# Patient Record
Sex: FEMALE | Race: WHITE | Employment: FULL TIME | URBAN - NONMETROPOLITAN AREA
[De-identification: names, ages, dates, MRNs, and addresses within clinical notes are randomized per-mention and may not be internally consistent; named-entity substitution may affect disease eponyms.]

---

## 2017-11-01 LAB
CHOLESTEROL, TOTAL: 222 MG/DL
CHOLESTEROL/HDL RATIO: 3.5
HDLC SERPL-MCNC: 63 MG/DL (ref 35–70)
LDL CHOLESTEROL CALCULATED: 160 MG/DL (ref 0–160)
TRIGL SERPL-MCNC: 147 MG/DL
VLDLC SERPL CALC-MCNC: 29 MG/DL

## 2018-10-31 ENCOUNTER — OFFICE VISIT (OUTPATIENT)
Dept: FAMILY MEDICINE CLINIC | Age: 48
End: 2018-10-31
Payer: COMMERCIAL

## 2018-10-31 VITALS
SYSTOLIC BLOOD PRESSURE: 122 MMHG | OXYGEN SATURATION: 98 % | DIASTOLIC BLOOD PRESSURE: 80 MMHG | RESPIRATION RATE: 16 BRPM | TEMPERATURE: 98.1 F | HEART RATE: 59 BPM | BODY MASS INDEX: 30.96 KG/M2 | WEIGHT: 209 LBS | HEIGHT: 69 IN

## 2018-10-31 DIAGNOSIS — Z11.1 TUBERCULOSIS SCREENING: ICD-10-CM

## 2018-10-31 DIAGNOSIS — I10 ESSENTIAL HYPERTENSION: ICD-10-CM

## 2018-10-31 DIAGNOSIS — Z13.1 DIABETES MELLITUS SCREENING: ICD-10-CM

## 2018-10-31 DIAGNOSIS — Z00.00 WELLNESS EXAMINATION: Primary | ICD-10-CM

## 2018-10-31 DIAGNOSIS — E78.00 HIGH CHOLESTEROL: ICD-10-CM

## 2018-10-31 DIAGNOSIS — Z11.1 ENCOUNTER FOR PPD TEST: ICD-10-CM

## 2018-10-31 PROCEDURE — 99203 OFFICE O/P NEW LOW 30 MIN: CPT | Performed by: NURSE PRACTITIONER

## 2018-10-31 PROCEDURE — 86580 TB INTRADERMAL TEST: CPT | Performed by: NURSE PRACTITIONER

## 2018-10-31 RX ORDER — OMEPRAZOLE 20 MG/1
20 CAPSULE, DELAYED RELEASE ORAL DAILY
COMMUNITY

## 2018-10-31 RX ORDER — ATORVASTATIN CALCIUM 20 MG/1
20 TABLET, FILM COATED ORAL DAILY
COMMUNITY

## 2018-10-31 ASSESSMENT — PATIENT HEALTH QUESTIONNAIRE - PHQ9
SUM OF ALL RESPONSES TO PHQ QUESTIONS 1-9: 0
1. LITTLE INTEREST OR PLEASURE IN DOING THINGS: 0
SUM OF ALL RESPONSES TO PHQ QUESTIONS 1-9: 0
2. FEELING DOWN, DEPRESSED OR HOPELESS: 0
SUM OF ALL RESPONSES TO PHQ9 QUESTIONS 1 & 2: 0

## 2018-11-02 ENCOUNTER — TELEPHONE (OUTPATIENT)
Dept: FAMILY MEDICINE CLINIC | Age: 48
End: 2018-11-02

## 2018-11-02 LAB
INDURATION: NORMAL
TB SKIN TEST: NORMAL

## 2018-11-06 PROBLEM — I10 ESSENTIAL HYPERTENSION: Status: ACTIVE | Noted: 2018-11-06

## 2018-11-06 PROBLEM — E78.00 HIGH CHOLESTEROL: Status: ACTIVE | Noted: 2018-11-06

## 2018-11-06 ASSESSMENT — ENCOUNTER SYMPTOMS
VOMITING: 0
ABDOMINAL DISTENTION: 0
ANAL BLEEDING: 0
EYES NEGATIVE: 1
SHORTNESS OF BREATH: 0
COUGH: 0
ABDOMINAL PAIN: 0
VOICE CHANGE: 0
TROUBLE SWALLOWING: 0

## 2018-11-06 NOTE — PROGRESS NOTES
10/31/2018    Mainor Dyson (:  1970) is a 51 y.o. female, here for evaluation of the following medical concerns:    Roe Mendez is here to become an established patient, she also needs a physical formed filled out for her employer. Hypertension:  Home blood pressure monitoring: No.  She is adherent to a low sodium diet. Patient denies chest pain, shortness of breath, headache, lightheadedness, blurred vision, peripheral edema, palpitations, dry cough and fatigue. Antihypertensive medication side effects: no medication side effects noted. Use of agents associated with hypertension: none. No results found for: NA No results found for: BUN No results found for: GLUCOSE   No results found for: K No results found for: CREATININE        Review of Systems   Constitutional: Negative for chills, fatigue and fever. HENT: Negative for congestion, dental problem, trouble swallowing and voice change. Eyes: Negative. Respiratory: Negative for cough and shortness of breath. Cardiovascular: Negative for chest pain and leg swelling. Gastrointestinal: Negative for abdominal distention, abdominal pain, anal bleeding and vomiting. Genitourinary: Negative for difficulty urinating and dysuria. Musculoskeletal: Negative. Skin: Negative. Neurological: Negative. Negative for dizziness, facial asymmetry and headaches. Psychiatric/Behavioral: Negative. Negative for sleep disturbance and suicidal ideas. Prior to Visit Medications    Medication Sig Taking?  Authorizing Provider   omeprazole (PRILOSEC) 20 MG delayed release capsule Take 20 mg by mouth daily Yes Historical Provider, MD   UNABLE TO FIND  Yes Historical Provider, MD   atorvastatin (LIPITOR) 20 MG tablet Take 20 mg by mouth daily Yes Historical Provider, MD   MEGARED OMEGA-3 KRILL OIL PO Take 800 mg by mouth daily Yes Historical Provider, MD   Nebivolol HCl (BYSTOLIC PO) Take 10 mg by mouth daily  Yes

## 2018-11-07 ENCOUNTER — NURSE ONLY (OUTPATIENT)
Dept: FAMILY MEDICINE CLINIC | Age: 48
End: 2018-11-07
Payer: COMMERCIAL

## 2018-11-07 DIAGNOSIS — Z11.1 VISIT FOR MANTOUX TEST: Primary | ICD-10-CM

## 2018-11-07 PROCEDURE — 86580 TB INTRADERMAL TEST: CPT | Performed by: NURSE PRACTITIONER

## 2018-11-09 LAB
INDURATION: NORMAL
TB SKIN TEST: NORMAL

## 2019-05-13 ENCOUNTER — HOSPITAL ENCOUNTER (EMERGENCY)
Age: 49
Discharge: HOME | End: 2019-05-13
Payer: COMMERCIAL

## 2019-05-13 VITALS
TEMPERATURE: 98.1 F | SYSTOLIC BLOOD PRESSURE: 128 MMHG | OXYGEN SATURATION: 95 % | WEIGHT: 200 LBS | DIASTOLIC BLOOD PRESSURE: 84 MMHG | RESPIRATION RATE: 16 BRPM | BODY MASS INDEX: 29.53 KG/M2

## 2019-05-13 DIAGNOSIS — R30.0 DYSURIA: ICD-10-CM

## 2019-05-13 DIAGNOSIS — N30.00 ACUTE CYSTITIS WITHOUT HEMATURIA: Primary | ICD-10-CM

## 2019-05-13 LAB
BACTERIA: ABNORMAL /HPF
BILIRUBIN URINE: NEGATIVE
BLOOD, URINE: ABNORMAL
CASTS 2: ABNORMAL /LPF
CASTS UA: ABNORMAL /LPF
CHARACTER, URINE: CLEAR
COLOR: ABNORMAL
CRYSTALS, UA: ABNORMAL
EPITHELIAL CELLS, UA: ABNORMAL /HPF
GLUCOSE URINE: NEGATIVE MG/DL
KETONES, URINE: NEGATIVE
LEUKOCYTE ESTERASE, URINE: ABNORMAL
MISCELLANEOUS 2: ABNORMAL
NITRITE, URINE: POSITIVE
PH UA: 6.5 (ref 5–9)
PROTEIN UA: NEGATIVE
RBC URINE: ABNORMAL /HPF
RENAL EPITHELIAL, UA: ABNORMAL
SPECIFIC GRAVITY, URINE: 1.01 (ref 1–1.03)
UROBILINOGEN, URINE: 1 EU/DL (ref 0–1)
WBC UA: ABNORMAL /HPF
YEAST: ABNORMAL

## 2019-05-13 PROCEDURE — 87184 SC STD DISK METHOD PER PLATE: CPT

## 2019-05-13 PROCEDURE — 87186 SC STD MICRODIL/AGAR DIL: CPT

## 2019-05-13 PROCEDURE — 81001 URINALYSIS AUTO W/SCOPE: CPT

## 2019-05-13 PROCEDURE — 87077 CULTURE AEROBIC IDENTIFY: CPT

## 2019-05-13 PROCEDURE — 99213 OFFICE O/P EST LOW 20 MIN: CPT

## 2019-05-13 PROCEDURE — 87086 URINE CULTURE/COLONY COUNT: CPT

## 2019-05-13 PROCEDURE — 99213 OFFICE O/P EST LOW 20 MIN: CPT | Performed by: NURSE PRACTITIONER

## 2019-05-13 RX ORDER — NITROFURANTOIN 25; 75 MG/1; MG/1
100 CAPSULE ORAL 2 TIMES DAILY
Qty: 20 CAPSULE | Refills: 0 | Status: SHIPPED | OUTPATIENT
Start: 2019-05-13 | End: 2019-05-23

## 2019-05-13 RX ORDER — CARVEDILOL 3.12 MG/1
3.12 TABLET ORAL 2 TIMES DAILY WITH MEALS
COMMUNITY

## 2019-05-13 ASSESSMENT — ENCOUNTER SYMPTOMS
BACK PAIN: 0
ABDOMINAL PAIN: 0
COLOR CHANGE: 0

## 2019-05-13 ASSESSMENT — PAIN SCALES - GENERAL: PAINLEVEL_OUTOF10: 2

## 2019-05-13 ASSESSMENT — PAIN DESCRIPTION - PAIN TYPE: TYPE: ACUTE PAIN

## 2019-05-13 NOTE — ED TRIAGE NOTES
Pt walked to room 5. Pt here with complaints of odor, frequency and not able to empty out. Started 6 days ago.

## 2019-05-13 NOTE — ED PROVIDER NOTES
[place] [address]  Urgent Care Encounter       CHIEF COMPLAINT       Chief Complaint   Patient presents with    Urinary Frequency     Frequency, urgency and not feeling like she is emptying out. Nurses Notes reviewed and I agree except as noted in the HPI. HISTORY OF PRESENT ILLNESS   Adam Alfred is a 47 y.o. female who presents     Patient states that for the last 2-3 days she has noticed foul-smelling urine, incomplete urinating, and increased frequency. She states that she's had UTIs in the past, and these symptoms are similar. She has been taking over-the-counter AZO to help alleviate the symptoms, and it is somewhat effective. Denies any back pain or abdominal cramping. Dysuria    This is a new problem. The current episode started 2 days ago. The problem occurs every urination. The problem has not changed since onset. The quality of the pain is described as burning and aching. There has been no fever. Associated symptoms include frequency, hesitancy and urgency. Pertinent negatives include no discharge, no hematuria and no flank pain. She has tried nothing for the symptoms. Her past medical history is significant for recurrent UTIs. Her past medical history does not include kidney stones, single kidney, urological procedure, urinary stasis or catheterization. REVIEW OF SYSTEMS     Review of Systems   Gastrointestinal: Negative for abdominal pain. Genitourinary: Positive for dysuria, frequency, hesitancy and urgency. Negative for decreased urine volume, difficulty urinating, dyspareunia, enuresis, flank pain, genital sores, hematuria, menstrual problem, pelvic pain, vaginal bleeding, vaginal discharge and vaginal pain. Musculoskeletal: Negative for back pain. Skin: Negative for color change, pallor, rash and wound. All other systems reviewed and are negative.       PAST MEDICAL HISTORY         Diagnosis Date    Acid reflux     High cholesterol 11/6/2018    Hypercholesteremia     Hypertension        SURGICALHISTORY     Patient  has a past surgical history that includes Tonsillectomy and Dental surgery. CURRENT MEDICATIONS       Previous Medications    ATORVASTATIN (LIPITOR) 20 MG TABLET    Take 20 mg by mouth daily    CARVEDILOL (COREG) 3.125 MG TABLET    Take 3.125 mg by mouth 2 times daily (with meals)    OMEPRAZOLE (PRILOSEC) 20 MG DELAYED RELEASE CAPSULE    Take 20 mg by mouth daily       ALLERGIES     Patient is is allergic to shellfish-derived products. Patients   Immunization History   Administered Date(s) Administered    DTaP (Infanrix) 1970, 1970, 1970, 03/18/1974    IPV (Ipol) 1970, 1970, 03/18/1974    MMR 01/06/1970    PPD Test 10/31/2018, 10/31/2018, 11/07/2018       FAMILY HISTORY     Patient's family history is not on file. SOCIAL HISTORY     Patient  reports that she has never smoked. She has never used smokeless tobacco. She reports that she drinks alcohol. She reports that she does not use drugs. PHYSICAL EXAM     ED TRIAGE VITALS  BP: 128/84, Temp: 98.1 °F (36.7 °C),  , Resp: 16, SpO2: 95 %,Estimated body mass index is 29.53 kg/m² as calculated from the following:    Height as of 10/31/18: 5' 9\" (1.753 m). Weight as of this encounter: 200 lb (90.7 kg). ,Patient's last menstrual period was 04/23/2019 (exact date). Physical Exam   Constitutional: She is oriented to person, place, and time. She appears well-developed and well-nourished. No distress. Musculoskeletal: Normal range of motion. Neurological: She is alert and oriented to person, place, and time. Skin: Skin is warm. Capillary refill takes less than 2 seconds. She is not diaphoretic. Psychiatric: She has a normal mood and affect. Her behavior is normal. Judgment and thought content normal.   Nursing note and vitals reviewed. DIAGNOSTIC RESULTS     Labs:No results found for this visit on 05/13/19.     IMAGING:    No orders to display URGENT CARE COURSE:     Vitals:    05/13/19 1655   BP: 128/84   Resp: 16   Temp: 98.1 °F (36.7 °C)   TempSrc: Oral   SpO2: 95%   Weight: 200 lb (90.7 kg)       Medications - No data to display         PROCEDURES:  None    FINAL IMPRESSION      1. Acute cystitis without hematuria    2. Dysuria          DISPOSITION/ PLAN   Patient is discharged home with prescription for Macrobid that she should start today. Patient's urine will be sent to Main lab as they could not be ran in urgent care setting due to patient taking AZO. She should continue taking adequate amounts of fluid to help flush out any bacteria from urinary tract. Recommend follow-up with primary care provider within one week if symptoms do not seem to be improving.         PATIENT REFERRED TO:  RAJ Norris CNP  [address] [facility] [address]      DISCHARGE MEDICATIONS:  New Prescriptions    NITROFURANTOIN, MACROCRYSTAL-MONOHYDRATE, (MACROBID) 100 MG CAPSULE    Take 1 capsule by mouth 2 times daily for 10 days       Discontinued Medications    BRINZOLAMIDE (AZOPT) 1 % OPHTHALMIC SUSPENSION    1 drop 3 times daily    MEGARED OMEGA-3 KRILL OIL PO    Take 800 mg by mouth daily    NEBIVOLOL HCL (BYSTOLIC PO)    Take 10 mg by mouth daily     UNABLE TO FIND           Current Discharge Medication List          RAJ Goodman NP    (Please note that portions of this note were completed with a voice recognition program. Efforts were made to edit the dictations but occasionally words are mis-transcribed.)         RAJ Wong NP  05/13/19 7031

## 2019-05-16 LAB
ORGANISM: ABNORMAL
URINE CULTURE REFLEX: ABNORMAL

## 2019-10-03 LAB
A/G RATIO: NORMAL
ALBUMIN SERPL-MCNC: 4.2 G/DL
ALP BLD-CCNC: 78 U/L
ALT SERPL-CCNC: 21 U/L
AST SERPL-CCNC: 24 U/L
AVERAGE GLUCOSE: 111
BASOPHILS ABSOLUTE: 0 /ΜL
BASOPHILS RELATIVE PERCENT: 0.7 %
BILIRUB SERPL-MCNC: 0.7 MG/DL (ref 0.1–1.4)
BILIRUBIN DIRECT: 0.2 MG/DL
BILIRUBIN, INDIRECT: NORMAL
CHOLESTEROL, TOTAL: 142 MG/DL
CHOLESTEROL/HDL RATIO: 2.5
EOSINOPHILS ABSOLUTE: 100 /ΜL
EOSINOPHILS RELATIVE PERCENT: 1.4 %
GLOBULIN: NORMAL
GLUCOSE FASTING: 98 MG/DL
HBA1C MFR BLD: 5.5 %
HCT VFR BLD CALC: 36.8 % (ref 36–46)
HDLC SERPL-MCNC: 50 MG/DL (ref 35–70)
HEMOGLOBIN: 12.3 G/DL (ref 12–16)
LDL CHOLESTEROL CALCULATED: 81 MG/DL (ref 0–160)
LYMPHOCYTES ABSOLUTE: 1200 /ΜL
LYMPHOCYTES RELATIVE PERCENT: 22.4 %
MCH RBC QN AUTO: 30.2 PG
MCHC RBC AUTO-ENTMCNC: 33.4 G/DL
MCV RBC AUTO: 90.3 FL
MONOCYTES ABSOLUTE: 600 /ΜL
MONOCYTES RELATIVE PERCENT: 10.3 %
NEUTROPHILS ABSOLUTE: 3500 /ΜL
NEUTROPHILS RELATIVE PERCENT: 65.2 %
PDW BLD-RTO: 13.5 %
PLATELET # BLD: 320 K/ΜL
PMV BLD AUTO: ABNORMAL FL
PROTEIN TOTAL: 6.2 G/DL
RBC # BLD: 4.07 10^6/ΜL
TRIGL SERPL-MCNC: 75 MG/DL
TSH SERPL DL<=0.05 MIU/L-ACNC: 2.09 UIU/ML
VLDLC SERPL CALC-MCNC: 15 MG/DL
WBC # BLD: 5.4 10^3/ML

## 2020-02-11 ENCOUNTER — TELEPHONE (OUTPATIENT)
Dept: FAMILY MEDICINE CLINIC | Age: 50
End: 2020-02-11

## 2020-02-11 NOTE — TELEPHONE ENCOUNTER
Called patient. No answer. Left message to return call.     Patient needs a HTN follow up    My chart message sent

## 2020-02-12 NOTE — TELEPHONE ENCOUNTER
spoke with patient. She stats that she follows with Dr. Pavithra Telles for HTN. Last seen in Sept 2019. He orders her labs and refills BP mediations.  Do you still need to see her?? Please advise

## 2020-02-12 NOTE — TELEPHONE ENCOUNTER
If she is following with Dr. Nicole Colon for her hypertension and does not need anything for myself that is fine. I would be more than happy to see her for her wellness visit though.

## 2020-02-17 ENCOUNTER — HOSPITAL ENCOUNTER (EMERGENCY)
Age: 50
Discharge: HOME | End: 2020-02-17
Payer: COMMERCIAL

## 2020-02-17 VITALS
BODY MASS INDEX: 25.92 KG/M2 | OXYGEN SATURATION: 100 % | HEIGHT: 69 IN | DIASTOLIC BLOOD PRESSURE: 80 MMHG | HEART RATE: 69 BPM | SYSTOLIC BLOOD PRESSURE: 136 MMHG | RESPIRATION RATE: 16 BRPM | WEIGHT: 175 LBS | TEMPERATURE: 97.6 F

## 2020-02-17 LAB
BILIRUBIN URINE: NEGATIVE
BLOOD, URINE: ABNORMAL
CHARACTER, URINE: CLEAR
COLOR: YELLOW
GLUCOSE, URINE: NEGATIVE MG/DL
KETONES, URINE: NEGATIVE
LEUKOCYTES, UA: ABNORMAL
NITRATE, UA: NEGATIVE
PH UA: 7 (ref 5–9)
PROTEIN UA: NEGATIVE MG/DL
REFLEX TO URINE C & S: ABNORMAL
SPECIFIC GRAVITY UA: 1.01 (ref 1–1.03)
UROBILINOGEN, URINE: 0.2 EU/DL (ref 0–1)

## 2020-02-17 PROCEDURE — 87086 URINE CULTURE/COLONY COUNT: CPT

## 2020-02-17 PROCEDURE — 87186 SC STD MICRODIL/AGAR DIL: CPT

## 2020-02-17 PROCEDURE — 99213 OFFICE O/P EST LOW 20 MIN: CPT

## 2020-02-17 PROCEDURE — 99213 OFFICE O/P EST LOW 20 MIN: CPT | Performed by: NURSE PRACTITIONER

## 2020-02-17 PROCEDURE — 87077 CULTURE AEROBIC IDENTIFY: CPT

## 2020-02-17 PROCEDURE — 81003 URINALYSIS AUTO W/O SCOPE: CPT

## 2020-02-17 RX ORDER — CEPHALEXIN 250 MG/1
250 CAPSULE ORAL 4 TIMES DAILY
Qty: 28 CAPSULE | Refills: 0 | Status: SHIPPED | OUTPATIENT
Start: 2020-02-17 | End: 2020-02-24

## 2020-02-17 ASSESSMENT — ENCOUNTER SYMPTOMS
ABDOMINAL PAIN: 1
DIARRHEA: 0
NAUSEA: 0
VOMITING: 0

## 2020-02-17 ASSESSMENT — PAIN DESCRIPTION - LOCATION: LOCATION: ABDOMEN

## 2020-02-17 ASSESSMENT — PAIN SCALES - GENERAL: PAINLEVEL_OUTOF10: 3

## 2020-02-17 NOTE — ED PROVIDER NOTES
Negative for diarrhea, nausea and vomiting. Genitourinary: Positive for dysuria, frequency, hesitancy and urgency. Negative for decreased urine volume, difficulty urinating, dyspareunia, enuresis, flank pain, genital sores, hematuria, menstrual problem, pelvic pain, vaginal bleeding, vaginal discharge and vaginal pain. Skin: Negative for rash. PAST MEDICAL HISTORY         Diagnosis Date    Acid reflux     High cholesterol 11/6/2018    Hypercholesteremia     Hypertension        SURGICALHISTORY     Patient  has a past surgical history that includes Tonsillectomy; Dental surgery; and Dilation and curettage of uterus. CURRENT MEDICATIONS       Discharge Medication List as of 2/17/2020  8:45 AM      CONTINUE these medications which have NOT CHANGED    Details   carvedilol (COREG) 3.125 MG tablet Take 3.125 mg by mouth 2 times daily (with meals)Historical Med      omeprazole (PRILOSEC) 20 MG delayed release capsule Take 20 mg by mouth dailyHistorical Med      atorvastatin (LIPITOR) 20 MG tablet Take 20 mg by mouth dailyHistorical Med             ALLERGIES     Patient is is allergic to shellfish-derived products. Patients   Immunization History   Administered Date(s) Administered    DTaP (Infanrix) 1970, 1969, 1969, 03/18/1974    MMR 01/06/1970    PPD Test 10/31/2018, 10/31/2018, 11/07/2018    Polio IPV (IPOL) 1969, 1969, 03/18/1974       FAMILY HISTORY     Patient's family history is not on file. SOCIAL HISTORY     Patient  reports that she has never smoked. She has never used smokeless tobacco. She reports current alcohol use. She reports that she does not use drugs. PHYSICAL EXAM     ED TRIAGE VITALS  BP: 136/80, Temp: 97.6 °F (36.4 °C), Pulse: 69, Resp: 16, SpO2: 100 %,Estimated body mass index is 25.84 kg/m² as calculated from the following:    Height as of this encounter: 5' 9\" (1.753 m). Weight as of this encounter: 175 lb (79.4 kg). ,Patient's last

## 2020-02-17 NOTE — ED NOTES
To [facility] with complaints of possible UTI. Symptoms started on Thursday. States she started out and a \"slow flow\", then developed urgency and frequency and foul smelling odor.      Supriya Merchant RN  02/17/20 5360

## 2020-02-18 ENCOUNTER — TELEPHONE (OUTPATIENT)
Dept: FAMILY MEDICINE CLINIC | Age: 50
End: 2020-02-18

## 2020-02-18 NOTE — LETTER
[address] [facility] [address] [facility] [place]  Phone: [phone]  Fax: [phone]    February 18, 2020    [address] [place] [address] [id number]    Dear Karen Parker,    Thank you for choosing [facility] on 2/17/20. Cindy Piper wanted to make sure that you understand your discharge instructions and that you were able to fill any prescriptions that may have been ordered for you. Please contact the office at the above phone number if you were advised to follow up with  Cindy Piper, or if you have any further questions or needs. Also, did you know [facility]. practices can often offer you an appointment on the same day that you call for acute issues. *We have some [facility] offices that offer Walk-in appointments; check our website for availability in your community, [email or web address].    *Evisits are now available for patients through [facility]. If you do not have [facility] DChart and are interested, please contact the office and a staff member may assist you or go to [email or web address].     Sincerely,    RAJ Perez CNP and [facility]

## 2020-02-19 LAB
ORGANISM: ABNORMAL
URINE CULTURE REFLEX: ABNORMAL

## 2020-03-03 NOTE — RESULT ENCOUNTER NOTE
It looks like the antibiotic was appropriate for her infection, please call her and get an update on how she is feeling.   Thank you

## 2022-11-02 ENCOUNTER — OFFICE VISIT (OUTPATIENT)
Dept: CARDIOLOGY CLINIC | Age: 52
End: 2022-11-02
Payer: COMMERCIAL

## 2022-11-02 VITALS
WEIGHT: 182 LBS | HEART RATE: 65 BPM | SYSTOLIC BLOOD PRESSURE: 148 MMHG | HEIGHT: 69 IN | BODY MASS INDEX: 26.96 KG/M2 | DIASTOLIC BLOOD PRESSURE: 70 MMHG

## 2022-11-02 DIAGNOSIS — R07.9 CHEST PAIN IN ADULT: ICD-10-CM

## 2022-11-02 DIAGNOSIS — R00.2 HEART PALPITATIONS: ICD-10-CM

## 2022-11-02 DIAGNOSIS — R06.02 SOB (SHORTNESS OF BREATH): Primary | ICD-10-CM

## 2022-11-02 PROCEDURE — 4004F PT TOBACCO SCREEN RCVD TLK: CPT | Performed by: INTERNAL MEDICINE

## 2022-11-02 PROCEDURE — G8427 DOCREV CUR MEDS BY ELIG CLIN: HCPCS | Performed by: INTERNAL MEDICINE

## 2022-11-02 PROCEDURE — 3074F SYST BP LT 130 MM HG: CPT | Performed by: INTERNAL MEDICINE

## 2022-11-02 PROCEDURE — 3078F DIAST BP <80 MM HG: CPT | Performed by: INTERNAL MEDICINE

## 2022-11-02 PROCEDURE — 3017F COLORECTAL CA SCREEN DOC REV: CPT | Performed by: INTERNAL MEDICINE

## 2022-11-02 PROCEDURE — 99204 OFFICE O/P NEW MOD 45 MIN: CPT | Performed by: INTERNAL MEDICINE

## 2022-11-02 PROCEDURE — G8484 FLU IMMUNIZE NO ADMIN: HCPCS | Performed by: INTERNAL MEDICINE

## 2022-11-02 PROCEDURE — G8419 CALC BMI OUT NRM PARAM NOF/U: HCPCS | Performed by: INTERNAL MEDICINE

## 2022-11-02 PROCEDURE — 93000 ELECTROCARDIOGRAM COMPLETE: CPT | Performed by: INTERNAL MEDICINE

## 2022-11-02 RX ORDER — ACETAMINOPHEN 160 MG
TABLET,DISINTEGRATING ORAL DAILY
COMMUNITY

## 2022-11-02 RX ORDER — CETIRIZINE HYDROCHLORIDE 10 MG/1
10 TABLET ORAL DAILY
COMMUNITY

## 2022-11-02 RX ORDER — AMLODIPINE BESYLATE 5 MG/1
5 TABLET ORAL DAILY
Qty: 30 TABLET | Refills: 3 | Status: SHIPPED | OUTPATIENT
Start: 2022-11-02

## 2022-11-02 RX ORDER — ISOSORBIDE MONONITRATE 30 MG/1
30 TABLET, EXTENDED RELEASE ORAL DAILY
COMMUNITY
End: 2022-11-02

## 2022-11-02 NOTE — PROGRESS NOTES
NEW PATIENT HERE FOR CHECK UP ABNORMAL TESTING AT [facility]    PT C/O MIGRANE HEADACHES AFTER STARTING IMDUR,WITH LIGHTHEADED AND NAUSEATED,CHEST DISCOMFORT, MORE SOB,     PT DENIES SWELLING IN LEGS AND FEET

## 2022-11-02 NOTE — PROGRESS NOTES
[address] [address]  Dept: [phone]  Dept Fax: [phone]  Loc: [phone]    Visit Date: 11/2/2022    Ms. Christie Henry is a 47 y.o. female  who presented for:  Chief Complaint   Patient presents with    New Patient    Shortness of Breath    Palpitations    Chest Pain     [facility] ABNORMAL STRESS       HPI:   48 yo F c hx of HTN, HLD is here to establish cardiology. Patient had been following up with Dr. Jacqueline Falcon at [facility] but wants to transfer care  here. She reports significant dyspnea on exertion. 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 Patient had Camilo Law in 06/2022. Continues to have fatigue and shortness of breath. Sleeps intermittently but does sleep of 7 hrs. Underwent stress testing at [facility] which showed basal to mid inferiolateral perfusion defect. Current Outpatient Medications:     norethindrone (AYGESTIN) 5 MG tablet, Take 5 mg by mouth daily, Disp: , Rfl:     Probiotic Product (PROBIOTIC DAILY PO), Take by mouth daily, Disp: , Rfl:     cetirizine (ZYRTEC) 10 MG tablet, Take 10 mg by mouth daily, Disp: , Rfl:     Cholecalciferol (VITAMIN D3) 50 MCG (2000 UT) CAPS, Take by mouth, Disp: , Rfl:     TURMERIC PO, Take 1,500 mg by mouth daily, Disp: , Rfl:     amLODIPine (NORVASC) 5 MG tablet, Take 1 tablet by mouth daily, Disp: 30 tablet, Rfl: 3    carvedilol (COREG) 3.125 MG tablet, Take 3.125 mg by mouth 2 times daily (with meals), Disp: , Rfl:     omeprazole (PRILOSEC) 20 MG delayed release capsule, Take 20 mg by mouth daily, Disp: , Rfl:     atorvastatin (LIPITOR) 20 MG tablet, Take 20 mg by mouth daily, Disp: , Rfl:     Past Medical History  Jaylin Coy  has a past medical history of AAA (abdominal aortic aneurysm), Acid reflux, High cholesterol, Hypercholesteremia, and Hypertension. Social History  Jaylin Coy  reports that she has never smoked.  She has never used smokeless tobacco. She reports current alcohol use. She reports that she does not use drugs. Family History  Nicolasa family history is not on file. Past Surgical History   Past Surgical History:   Procedure Laterality Date    DENTAL SURGERY      DILATION AND CURETTAGE OF UTERUS      FINGER SURGERY Right 2021    FOOT SURGERY Left 02/2021    FOOT RECONSTRUCTION    TONSILLECTOMY         Subjective:     REVIEW OF SYSTEMS  Constitutional: denies sweats, chills and fever  HENT: denies  congestion, sinus pressure, sneezing and sore throat. Eyes: denies  pain, discharge, redness and itching. Respiratory: denies apnea, cough  Gastrointestinal: denies blood in stool, constipation, diarrhea   Endocrine: denies cold intolerance, heat intolerance, polydipsia. Genitourinary: denies dysuria, enuresis, flank pain and hematuria. Musculoskeletal: denies arthralgias, joint swelling and neck pain. Neurological: denies numbness and headaches. Psychiatric/Behavioral: denies agitation, confusion, decreased concentration and dysphoric mood    All others reviewed and are negative. Objective:     BP (!) 160/92   Pulse 65   Ht 5' 9\" (1.753 m)   Wt 182 lb (82.6 kg)   BMI 26.88 kg/m²     Wt Readings from Last 3 Encounters:   11/02/22 182 lb (82.6 kg)   02/17/20 175 lb (79.4 kg)   05/13/19 200 lb (90.7 kg)     BP Readings from Last 3 Encounters:   11/02/22 (!) 160/92   02/17/20 136/80   05/13/19 128/84       PHYSICAL EXAM  Constitutional: Oriented to person, place, and time. Appears well-developed and well-nourished. HENT:   Head: Normocephalic and atraumatic. Eyes: EOM are normal. Pupils are equal, round, and reactive to light. Neck: Normal range of motion. Neck supple. No JVD present. Cardiovascular: Normal rate , normal heart sounds and intact distal pulses. Pulmonary/Chest: Effort normal and breath sounds normal. No respiratory distress. No wheezes. No rales. Abdominal: Soft. Bowel sounds are normal. No distension. There is no tenderness. Musculoskeletal: Normal range of motion. No edema. Neurological: Alert and oriented to person, place, and time. No cranial nerve deficit. Coordination normal.   Skin: Skin is warm and dry. Psychiatric: Normal mood and affect. No results found for: CKTOTAL, CKMB, CKMBINDEX    Lab Results   Component Value Date/Time    WBC 6.4 10/24/2022 08:02 AM    RBC 4.12 10/24/2022 08:02 AM    HGB 13.0 10/24/2022 08:02 AM    HCT 37.9 10/24/2022 08:02 AM    MCV 92.0 10/24/2022 08:02 AM    MCH 31.7 10/24/2022 08:02 AM    MCHC 34.5 10/24/2022 08:02 AM    RDW 13.0 10/24/2022 08:02 AM     10/24/2022 08:02 AM       Lab Results   Component Value Date/Time     10/24/2022 08:02 AM    K 4.1 10/24/2022 08:02 AM     10/24/2022 08:02 AM    CO2 24 10/24/2022 08:02 AM    BUN 15 10/24/2022 08:02 AM    LABALBU 4.3 10/24/2022 08:02 AM    CREATININE 0.77 10/24/2022 08:02 AM    CALCIUM 9.10 10/24/2022 08:02 AM    GLUCOSE 91 10/24/2022 08:02 AM       Lab Results   Component Value Date/Time    ALKPHOS 75 10/24/2022 08:02 AM    ALT 12 10/24/2022 08:02 AM    AST 17 10/24/2022 08:02 AM    PROT 6.4 10/24/2022 08:02 AM    PROT 6.2 10/03/2019 12:00 AM    BILITOT 0.6 10/24/2022 08:02 AM    BILIDIR 0.1 08/26/2022 08:15 AM    LABALBU 4.3 10/24/2022 08:02 AM       Lab Results   Component Value Date/Time    MG 1.8 10/24/2022 08:02 AM       No results found for: INR, PROTIME      Lab Results   Component Value Date/Time    LABA1C 5.5 10/03/2019 12:00 AM       Lab Results   Component Value Date/Time    TRIG 72 10/24/2022 08:02 AM    HDL 50 10/24/2022 08:02 AM    LDLCALC 81 10/03/2019 12:00 AM    LDLDIRECT 79 10/24/2022 08:02 AM       Lab Results   Component Value Date/Time    TSH 1.493 01/14/2021 08:34 AM         Testing Reviewed:      I haveindividually reviewed the below cardiac tests    EKG:    ECHO: No results found for this or any previous visit. STRESS:    CATH:    Assessment/Plan       Diagnosis Orders   1.  SOB (shortness of breath)  EKG 12 Lead      2. Chest pain in adult  EKG 12 Lead      3. Heart palpitations  EKG 12 Lead        Shortness of breath   Dyspnea on exertion  COVID19 infection in 6/2022  HTN  HLD  Abnormal stress testing at [facility]    Discussed results with patient and family  Consider RHC/LHC due to significant dyspnea  Also had RV dilation  No ASD visualized on cardiac MRI  R/B/A were discussed   Labs reviewed  Working a lot of hours  The patient is asked to make an attempt to improve diet and exercise patterns to aid in medical management of this problem. Advised more plant based nutrition/meditarrean diet   Advised patient to call office or seek immediate medical attention if there is any new onset of  any chest pain, sob, palpitations, lightheadedness, dizziness, orthopnea, PND or pedal edema. All medication side effects were discussed in details. Thank youfor allowing me to participate in the care of this patient. Please do not hesitate to contact me for any further questions. Return in about 3 months (around 2/2/2023), or if symptoms worsen or fail to improve, for Review testing, Regular follow up.        Electronically signed by Yoselin Batista MD [facility]  11/2/2022 at 8:04 AM EDT

## 2022-11-08 ENCOUNTER — PRE-PROCEDURE TELEPHONE (OUTPATIENT)
Dept: INPATIENT UNIT | Age: 52
End: 2022-11-08

## 2022-11-08 ENCOUNTER — TELEPHONE (OUTPATIENT)
Dept: CARDIOLOGY CLINIC | Age: 52
End: 2022-11-08

## 2022-11-08 NOTE — TELEPHONE ENCOUNTER
PROCEDURE: cardiac cath    DATE OF SERVICE: 11/14/2022    SERVICE LOCATION: [facility]    CPT CODE: 39723    PHYSICIAN: jan    DATE PRIOR AUTH SUBMITTED: 11/03/2022    STATUS: approved.      CASE NUMBER: [id number]    AUTH NUMBER: [id number]    VALID:  11/08/2022-12/23/2022

## 2022-11-11 ENCOUNTER — PREP FOR PROCEDURE (OUTPATIENT)
Dept: CARDIOLOGY | Age: 52
End: 2022-11-11

## 2022-11-11 RX ORDER — SODIUM CHLORIDE 9 MG/ML
INJECTION, SOLUTION INTRAVENOUS PRN
Status: CANCELLED | OUTPATIENT
Start: 2022-11-11

## 2022-11-11 RX ORDER — ASPIRIN 325 MG
325 TABLET ORAL ONCE
Status: CANCELLED | OUTPATIENT
Start: 2022-11-11 | End: 2022-11-11

## 2022-11-11 RX ORDER — SODIUM CHLORIDE 0.9 % (FLUSH) 0.9 %
5-40 SYRINGE (ML) INJECTION EVERY 12 HOURS SCHEDULED
Status: CANCELLED | OUTPATIENT
Start: 2022-11-11

## 2022-11-11 RX ORDER — DIPHENHYDRAMINE HYDROCHLORIDE 50 MG/ML
50 INJECTION INTRAMUSCULAR; INTRAVENOUS ONCE
Status: CANCELLED | OUTPATIENT
Start: 2022-11-11 | End: 2022-11-11

## 2022-11-11 RX ORDER — SODIUM CHLORIDE 0.9 % (FLUSH) 0.9 %
5-40 SYRINGE (ML) INJECTION PRN
Status: CANCELLED | OUTPATIENT
Start: 2022-11-11

## 2022-11-11 RX ORDER — NITROGLYCERIN 0.4 MG/1
0.4 TABLET SUBLINGUAL EVERY 5 MIN PRN
Status: CANCELLED | OUTPATIENT
Start: 2022-11-11

## 2022-11-14 ENCOUNTER — HOSPITAL ENCOUNTER (OUTPATIENT)
Dept: INPATIENT UNIT | Age: 52
Discharge: HOME | End: 2022-11-14
Attending: INTERNAL MEDICINE | Admitting: INTERNAL MEDICINE
Payer: COMMERCIAL

## 2022-11-14 VITALS
SYSTOLIC BLOOD PRESSURE: 124 MMHG | WEIGHT: 180 LBS | TEMPERATURE: 98.6 F | RESPIRATION RATE: 15 BRPM | DIASTOLIC BLOOD PRESSURE: 86 MMHG | BODY MASS INDEX: 26.66 KG/M2 | OXYGEN SATURATION: 96 % | HEIGHT: 69 IN | HEART RATE: 79 BPM

## 2022-11-14 DIAGNOSIS — I10 ESSENTIAL HYPERTENSION: Primary | ICD-10-CM

## 2022-11-14 LAB
ABO: NORMAL
ANION GAP SERPL CALCULATED.3IONS-SCNC: 13 MEQ/L (ref 8–16)
ANTIBODY SCREEN: NORMAL
AVERAGE GLUCOSE: 93 MG/DL (ref 70–126)
BUN BLDV-MCNC: 13 MG/DL (ref 7–22)
CALCIUM SERPL-MCNC: 9.5 MG/DL (ref 8.5–10.5)
CHLORIDE BLD-SCNC: 105 MEQ/L (ref 98–111)
CHOLESTEROL, TOTAL: 156 MG/DL (ref 100–199)
CO2: 21 MEQ/L (ref 23–33)
CREAT SERPL-MCNC: 0.6 MG/DL (ref 0.4–1.2)
EKG ATRIAL RATE: 64 BPM
EKG P AXIS: 53 DEGREES
EKG P-R INTERVAL: 160 MS
EKG Q-T INTERVAL: 408 MS
EKG QRS DURATION: 94 MS
EKG QTC CALCULATION (BAZETT): 420 MS
EKG R AXIS: 2 DEGREES
EKG T AXIS: 19 DEGREES
EKG VENTRICULAR RATE: 64 BPM
ERYTHROCYTE [DISTWIDTH] IN BLOOD BY AUTOMATED COUNT: 12.4 % (ref 11.5–14.5)
ERYTHROCYTE [DISTWIDTH] IN BLOOD BY AUTOMATED COUNT: 41.1 FL (ref 35–45)
GFR SERPL CREATININE-BSD FRML MDRD: > 60 ML/MIN/1.73M2
GLUCOSE BLD-MCNC: 97 MG/DL (ref 70–108)
HBA1C MFR BLD: 5.1 % (ref 4.4–6.4)
HCT VFR BLD CALC: 42.3 % (ref 37–47)
HDLC SERPL-MCNC: 52 MG/DL
HEMOGLOBIN: 14.2 GM/DL (ref 12–16)
INR BLD: 0.98 (ref 0.85–1.13)
LDL CHOLESTEROL CALCULATED: 88 MG/DL
MCH RBC QN AUTO: 30.8 PG (ref 26–33)
MCHC RBC AUTO-ENTMCNC: 33.6 GM/DL (ref 32.2–35.5)
MCV RBC AUTO: 91.8 FL (ref 81–99)
PLATELET # BLD: 353 THOU/MM3 (ref 130–400)
PMV BLD AUTO: 9.4 FL (ref 9.4–12.4)
POTASSIUM SERPL-SCNC: 4 MEQ/L (ref 3.5–5.2)
PREGNANCY, SERUM: NEGATIVE
RBC # BLD: 4.61 MILL/MM3 (ref 4.2–5.4)
RH FACTOR: NORMAL
SODIUM BLD-SCNC: 139 MEQ/L (ref 135–145)
TRIGL SERPL-MCNC: 79 MG/DL (ref 0–199)
WBC # BLD: 7.6 THOU/MM3 (ref 4.8–10.8)

## 2022-11-14 PROCEDURE — 2580000003 HC RX 258: Performed by: INTERNAL MEDICINE

## 2022-11-14 PROCEDURE — C1894 INTRO/SHEATH, NON-LASER: HCPCS

## 2022-11-14 PROCEDURE — 93010 ELECTROCARDIOGRAM REPORT: CPT | Performed by: INTERNAL MEDICINE

## 2022-11-14 PROCEDURE — 99152 MOD SED SAME PHYS/QHP 5/>YRS: CPT

## 2022-11-14 PROCEDURE — 84703 CHORIONIC GONADOTROPIN ASSAY: CPT

## 2022-11-14 PROCEDURE — C1732 CATH, EP, DIAG/ABL, 3D/VECT: HCPCS

## 2022-11-14 PROCEDURE — 93005 ELECTROCARDIOGRAM TRACING: CPT | Performed by: NURSE PRACTITIONER

## 2022-11-14 PROCEDURE — 93458 L HRT ARTERY/VENTRICLE ANGIO: CPT

## 2022-11-14 PROCEDURE — 2500000003 HC RX 250 WO HCPCS

## 2022-11-14 PROCEDURE — 6360000002 HC RX W HCPCS: Performed by: NURSE PRACTITIONER

## 2022-11-14 PROCEDURE — 86900 BLOOD TYPING SEROLOGIC ABO: CPT

## 2022-11-14 PROCEDURE — 83036 HEMOGLOBIN GLYCOSYLATED A1C: CPT

## 2022-11-14 PROCEDURE — 2709999900 HC NON-CHARGEABLE SUPPLY

## 2022-11-14 PROCEDURE — 86901 BLOOD TYPING SEROLOGIC RH(D): CPT

## 2022-11-14 PROCEDURE — 6370000000 HC RX 637 (ALT 250 FOR IP): Performed by: NURSE PRACTITIONER

## 2022-11-14 PROCEDURE — 36415 COLL VENOUS BLD VENIPUNCTURE: CPT

## 2022-11-14 PROCEDURE — 80061 LIPID PANEL: CPT

## 2022-11-14 PROCEDURE — 85027 COMPLETE CBC AUTOMATED: CPT

## 2022-11-14 PROCEDURE — C1730 CATH, EP, 19 OR FEW ELECT: HCPCS

## 2022-11-14 PROCEDURE — 85610 PROTHROMBIN TIME: CPT

## 2022-11-14 PROCEDURE — 80048 BASIC METABOLIC PNL TOTAL CA: CPT

## 2022-11-14 PROCEDURE — 6360000004 HC RX CONTRAST MEDICATION: Performed by: INTERNAL MEDICINE

## 2022-11-14 PROCEDURE — C1769 GUIDE WIRE: HCPCS

## 2022-11-14 PROCEDURE — 6360000002 HC RX W HCPCS

## 2022-11-14 PROCEDURE — 86850 RBC ANTIBODY SCREEN: CPT

## 2022-11-14 RX ORDER — SODIUM CHLORIDE 0.9 % (FLUSH) 0.9 %
5-40 SYRINGE (ML) INJECTION EVERY 12 HOURS SCHEDULED
Status: CANCELLED | OUTPATIENT
Start: 2022-11-14

## 2022-11-14 RX ORDER — NITROGLYCERIN 0.4 MG/1
0.4 TABLET SUBLINGUAL EVERY 5 MIN PRN
Status: DISCONTINUED | OUTPATIENT
Start: 2022-11-14 | End: 2022-11-14 | Stop reason: HOSPADM

## 2022-11-14 RX ORDER — DIPHENHYDRAMINE HYDROCHLORIDE 50 MG/ML
50 INJECTION INTRAMUSCULAR; INTRAVENOUS ONCE
Status: COMPLETED | OUTPATIENT
Start: 2022-11-14 | End: 2022-11-14

## 2022-11-14 RX ORDER — ACETAMINOPHEN 325 MG/1
650 TABLET ORAL EVERY 4 HOURS PRN
Status: DISCONTINUED | OUTPATIENT
Start: 2022-11-14 | End: 2022-11-14 | Stop reason: HOSPADM

## 2022-11-14 RX ORDER — SODIUM CHLORIDE 0.9 % (FLUSH) 0.9 %
5-40 SYRINGE (ML) INJECTION PRN
Status: CANCELLED | OUTPATIENT
Start: 2022-11-14

## 2022-11-14 RX ORDER — ASPIRIN 325 MG
325 TABLET ORAL ONCE
Status: COMPLETED | OUTPATIENT
Start: 2022-11-14 | End: 2022-11-14

## 2022-11-14 RX ORDER — SODIUM CHLORIDE 9 MG/ML
INJECTION, SOLUTION INTRAVENOUS PRN
Status: CANCELLED | OUTPATIENT
Start: 2022-11-14

## 2022-11-14 RX ORDER — SODIUM CHLORIDE 9 MG/ML
INJECTION, SOLUTION INTRAVENOUS CONTINUOUS
Status: CANCELLED | OUTPATIENT
Start: 2022-11-14

## 2022-11-14 RX ORDER — SODIUM CHLORIDE 9 MG/ML
INJECTION, SOLUTION INTRAVENOUS CONTINUOUS
Status: DISCONTINUED | OUTPATIENT
Start: 2022-11-14 | End: 2022-11-14 | Stop reason: HOSPADM

## 2022-11-14 RX ADMIN — IOPAMIDOL 50 ML: 755 INJECTION, SOLUTION INTRAVENOUS at 15:36

## 2022-11-14 RX ADMIN — ASPIRIN 325 MG: 325 TABLET ORAL at 11:40

## 2022-11-14 RX ADMIN — HYDROCORTISONE SODIUM SUCCINATE 200 MG: 100 INJECTION, POWDER, FOR SOLUTION INTRAMUSCULAR; INTRAVENOUS at 11:49

## 2022-11-14 RX ADMIN — DIPHENHYDRAMINE HYDROCHLORIDE 50 MG: 50 INJECTION, SOLUTION INTRAMUSCULAR; INTRAVENOUS at 11:41

## 2022-11-14 RX ADMIN — SODIUM CHLORIDE: 9 INJECTION, SOLUTION INTRAVENOUS at 11:36

## 2022-11-14 NOTE — PROGRESS NOTES
1059  Pt admitted to  2E17 ambulatory for cardiac cath. Pt NPO. Patient accompanied by wife. Vital signs obtained. Assessment and data collection initiated. Oriented to room. Policies and procedures for 2E explained   All questions answered with no further questions at this time. Fall prevention and safety precautions discussed with patient. 1320  Dr Miller delayed in procedure - pt made aware  5  Dr Faye Berry in to see pt. Dr informed of administration time of meds for shellfish allergy. 1458  Pt to cath lab per bed  1545  Pt ret'd from cath lab. Right radial site with vasc band and armboard. Pt denies any pain, numbness or tingling.  IV 0.9NS cont'd.  at bedside  1645  Pt taking diet/fluids - bartolo well  1700  Pt and  with many questions - Dr Faye Berry in to address these questions  1800  Pt amb in neves - denies any dizziness. Ret'd to room, vd in bathroom w/o difficulty  1815  IV site discont'd. Telemetry discont'd  Pt discharged per w/c for transport home with  via central transport  1822  Pt discharged per w/c for transport home with her  via central transport.

## 2022-11-14 NOTE — DISCHARGE INSTRUCTIONS
Post Procedure Care - Radial Site    Home Care    * Keep procedure site clean and dry. * You may shower tomorrow  * Wash site gently with soap and water, pat the area dry and cover with a bandaid daily for the next 5days  * Monitor site for signs of bleeding, swelling or infection. * Do not use any powders, lotions or creams in the area of the procedure until the site is healed. * Do not soak in any pools of water until site is healed, such as dish water, a bathtub, hot tub or swimming pool (generally 5-7 days). * The access site may appear bruised. It is normal to have some discoloration at the site. If the discoloration is enlarging or is larger than the palm of your hand, call the Physicians office    * It is normal to have some tenderness or soreness at the site of the procedure. This may be relieved with the use of tylenol. Physical Activity     * Minimize movement of right hand/wrist for 24 hours  * Maintain armboard for 24 hours  * Limit activity times 2 days. * No driving times 2 days. * No lifting greater than 5 pounds, pushing or pulling with right arm for 72 hours      Call 911 if   You are bleeding from the area where the catheter was put in your artery. Immediately hold firm pressure on the site, lie down and call 911 to transport you to nearest emergency  room. You have a fast-growing, painful lump at the catheter site. Immediately hold firm pressure on site, lie down and call 911 to transport you to nearest emergency room. If you notice numbness, tingling, coldness or loss of feeling in the extremity on the side of the procedure, CALL THE PHYSICIAN IMMEDIATELY or 911   Notify office if you have signs of infection, such as: Increased pain, swelling, warmth, or redness. Red streaks leading from the catheter site. Pus draining from the catheter site.   If you develop a fever within 2-3 days after your procedure

## 2022-11-14 NOTE — BRIEF OP NOTE
[address] [place] [address]  Sedation/Analgesia Post Sedation Record        Pt Name: Shan [place]  MRN: [id number]  YOB: 1970  Procedure Performed By: Corina Sage MD MD, [address]  Primary Care Physician: RAJ Chao CNP    POST-PROCEDURE    Sedation/Anesthesia:  Local Anesthesia and IV Conscious Sedation with continuous O2 monitoring    Estimated Blood Loss: 10 cc     Specimens Removed:  [x]None []Other:      Disposition of Specimen:  []Pathology []Other        Complications:   [x]None Immediate []Other:       Procedure performed: Left heart cath    Post Procedure Diagnosis/Findings:  Patent coronaries       Recommendations:    Medical treatment  Routine post-cath care.                Corina Sage MD MD, [address]  Electronically signed 11/14/2022 at 3:45 PM

## 2022-11-14 NOTE — H&P
[facility]  Sedation/Analgesia History & Physical    Pt Name: Charles Mitchell  Account number: [de-identified]  MRN: [id number]  YOB: 1970  Provider Performing Procedure: Rhett Cesar MD [place] [facility] [place]  Primary Care Physician: RAJ Drake - CNP  Date: 11/14/2022    PRE-PROCEDURE    Code Status: FULL CODE  Brief History/Pre-Procedure Diagnosis: angina iii    Consent: : I have discussed with the patient risks, benefits, and alternatives (and relevant risks, benefits, and side effects related to alternatives or not receiving care), and likelihood of the success. The patient and/or representative appear to understand and agree to proceed. The discussion encompasses risks, benefits, and side effects related to the alternatives and the risks related to not receiving the proposed care, treatment, and services. PLANNED PROCEDURE   [x]Cath  [x]PCI                []Pacemaker/AICD  []AMBER             []Cardioversion []Peripheral angiography/PTA  []Other:      VITAL SIGNS   Vitals:    11/14/22 1152   BP:    Pulse: 73   Resp:    Temp:    SpO2:        PHYSICAL:   General: No acute distress  HEENT:  Unremarkable for age  Neck: without increased JVD, carotid pulses 2+ bilaterally without bruits  Heart: RRR, S1 & S2 WNL   Lungs: Clear to auscultation    Abdomen: BS present, without HSM, masses, or tenderness    Extremities: without C,C,E.  Pulses 2+ bilaterally  Mental Status: Alert & Oriented    SEDATION  Planned agent:[x]Midazolam []Meperidine []Sublimaze []Morphine  []Diazepam  [x]Other: fentanyl      ASA Classification:  []1 []2 [x]3 []4 []5  Class 1: A normal healthy patient  Class 2: Pt with mild to moderate systemic disease  Class 3: Severe systemic disease or disturbance  Class 4: Severe systemic disorders that are already life threatening. Class 5: Moribund pt with little chances of survival, for more than 24 hours.   Mallampati I Airway Classification:   []1 []2 [x]3 []4          MEDICAL HISTORY   has a past medical history of Acid reflux, High cholesterol, Hypercholesteremia, and Hypertension. []Additional information:          SURGICAL HISTORY   has a past surgical history that includes Tonsillectomy; Dental surgery; Dilation and curettage of uterus; Foot surgery (Left, 02/2021); and Finger surgery (Right, 2021). Additional information:       ALLERGIES   Allergies as of 11/14/2022 - Fully Reviewed 11/02/2022   Allergen Reaction Noted    Wasp venom Anaphylaxis 11/02/2022    Hornet venom  11/02/2022    Shellfish-derived products Nausea Only and Rash 10/31/2018     Additional information:       MEDICATIONS     Current Facility-Administered Medications:     0.9 % sodium chloride infusion, , IntraVENous, Continuous, Barry Miller MD, Last Rate: 75 mL/hr at 11/14/22 1136, New Bag at 11/14/22 1136    nitroGLYCERIN (NITROSTAT) SL tablet 0.4 mg, 0.4 mg, SubLINGual, Q5 Min PRN, RAJ Benitez - CNP  Prior to Admission medications    Medication Sig Start Date End Date Taking?  Authorizing Provider   norethindrone (AYGESTIN) 5 MG tablet Take 5 mg by mouth daily    Historical Provider, MD   Probiotic Product (PROBIOTIC DAILY PO) Take by mouth daily    Historical Provider, MD   cetirizine (ZYRTEC) 10 MG tablet Take 10 mg by mouth daily    Historical Provider, MD   Cholecalciferol (VITAMIN D3) 50 MCG (2000 UT) CAPS Take by mouth daily    Historical Provider, MD   TURMERIC PO Take 1,500 mg by mouth daily    Historical Provider, MD   amLODIPine (NORVASC) 5 MG tablet Take 1 tablet by mouth daily 11/2/22   Juany Swartz MD   carvedilol (COREG) 3.125 MG tablet Take 3.125 mg by mouth 2 times daily (with meals)    Historical Provider, MD   omeprazole (PRILOSEC) 20 MG delayed release capsule Take 20 mg by mouth daily    Historical Provider, MD   atorvastatin (LIPITOR) 20 MG tablet Take 20 mg by mouth daily    Historical Provider, MD     Additional information:                 Patrica Reese MD CLEOPATRA Miller [facility] [place]  Electronically signed 11/14/2022 at 3:26 PM

## 2022-11-15 NOTE — PLAN OF CARE
Problem: Discharge Planning  Goal: Discharge to home or other facility with appropriate resources  Outcome: Completed   Care plan reviewed with patient and . Patient and  verbalize understanding of the plan of care and contribute to goal setting. Pt discharged home      Problem: Safety - Adult  Goal: Free from fall injury  Outcome: Completed   Pt placed on fall preventions.   Pt free from fall/injury at discharge

## 2022-11-21 ENCOUNTER — TELEPHONE (OUTPATIENT)
Dept: CARDIOLOGY CLINIC | Age: 52
End: 2022-11-21

## 2022-11-21 DIAGNOSIS — R06.02 SHORTNESS OF BREATH: Primary | ICD-10-CM

## 2022-11-21 NOTE — TELEPHONE ENCOUNTER
Adán Dyson Plan  to P [facility] Clinical Support Pool (supporting Arlene Solomon MD)      6:32 AM  Dr. Gerber Mc has already ordered pulmonary function testing and referral to Dr. Billy Meyer. I have test 12/6 and see pulmonologist 12/20. Asking if can have chest x ray for chest pain/tightness and SOB prior to seeing Dr. Billy Meyer. My coronary arteries are normal from Cath done 11/14. Thank you in advance for your consideration. I advised patient to follow up with PCP.

## 2022-11-21 NOTE — TELEPHONE ENCOUNTER
Dr. Kirstin Krishna has already ordered pulmonary function testing and referral to Dr. Claribel Gold. I have test 12/6 and see pulmonologist 12/20. Asking if can have chest x ray for chest pain/tightness and SOB prior to seeing Dr. Claribel Gold. My coronary arteries are normal from Cath done 11/14. Thank you in advance for your consideration. Dr. Damaris Watts asking for CXR order. I advised her to get from PCP but doesn't sound like she is routinely following with one. Please advise.

## 2022-12-01 ENCOUNTER — HOSPITAL ENCOUNTER (OUTPATIENT)
Dept: GENERAL RADIOLOGY | Age: 52
Discharge: HOME | End: 2022-12-01
Payer: COMMERCIAL

## 2022-12-01 ENCOUNTER — HOSPITAL ENCOUNTER (OUTPATIENT)
Age: 52
Discharge: HOME | End: 2022-12-01
Payer: COMMERCIAL

## 2022-12-01 DIAGNOSIS — R06.02 SHORTNESS OF BREATH: ICD-10-CM

## 2022-12-01 PROCEDURE — 71046 X-RAY EXAM CHEST 2 VIEWS: CPT

## 2022-12-06 ENCOUNTER — HOSPITAL ENCOUNTER (OUTPATIENT)
Dept: PULMONOLOGY | Age: 52
Discharge: HOME | End: 2022-12-06
Payer: COMMERCIAL

## 2022-12-06 DIAGNOSIS — I10 ESSENTIAL HYPERTENSION: ICD-10-CM

## 2022-12-06 PROCEDURE — 94060 EVALUATION OF WHEEZING: CPT

## 2022-12-06 PROCEDURE — 94726 PLETHYSMOGRAPHY LUNG VOLUMES: CPT

## 2022-12-06 PROCEDURE — 94729 DIFFUSING CAPACITY: CPT

## 2022-12-08 NOTE — PROCEDURES
[address] [id number]                            CARDIAC CATHETERIZATION    PATIENT NAME: JANUSZ OKEEFE                       :        1970  MED REC NO:   [id number]                           ROOM:       0017  ACCOUNT NO:   [de-identified]                           ADMIT DATE: 2022  PROVIDER:     Josh Roman MD    DATE OF PROCEDURE:  2022    PROCEDURES PERFORMED:  Coronary angiogram, LV gram.    INDICATIONS FOR STUDY:  Angina-equivalent symptoms. DESCRIPTION OF PROCEDURE:  After written informed consent was obtained,  the patient was brought to the cardiac catheterization laboratory in a  fasting, nonsedated state. Preprocedure time-out was performed. 2%  lidocaine was used to anesthetize the subcutaneous tissue overlying the  right radial artery. Using a modified Seldinger technique, a 6-Portuguese  Slender arterial sheath was placed in the right radial artery. Once the  sheath was in place, a radial cocktail was given. EQUIPMENTS USED:  Standard 5-Portuguese JR4, JL3.5 diagnostic catheters. ESTIMATED BLOOD LOSS DURING THIS PROCEDURE:  Less than 20 mL. MEDICATIONS:  Please see MAR. CORONARY ANGIOGRAM:  1. Right coronary artery appears to be the nondominant system. It is  overall patent with mild luminal irregularities in the distal portions  of the vessel. 2.  Left main is short, but patent, gives rise to LAD and left  circumflex arteries. 3.  Left circumflex artery is a large vessel. It is overall patent with  no significant disease in the proximal, mid, and distal portions of the  vessel. There is a large OM1 vessel, which is overall patent without  any significant disease. 4. LAD is patent in the proximal portion. In the mid portion, there is  a mild myocardial bridging, otherwise the vessel beyond that is overall  patent with no significant disease. LVEDP was measured to be 2 mmHg.   There is no significant gradient  across the aortic valve, and LV systolic function is estimated at 60%. The patient tolerated the procedure well. There were no immediate  complications. Hemostasis of the right radial artery was achieved with  the Vasc Band device. She was transferred to her room in stable  condition. SUMMARY:  1. Patent coronaries. 2.  LVEDP was measured to be 2 mmHg. 3.  LV systolic function was roughly estimated at 60%. RECOMMENDATIONS:  1. Optimize medical therapy. 2.  IV hydration. 3.  Monitor radial artery access site for bleeding/hematoma. 4.  Risk factor modification. All procedure details and management plans were discussed in detail with  the patient and family members, and they were in agreement with the  plan.         Toshia Taylor MD    D: 12/08/2022 10:04:19       T: 12/08/2022 11:08:43     [email or web address]  Job#: [id number]     Doc#: [id number]    CC:

## 2022-12-09 ENCOUNTER — TELEPHONE (OUTPATIENT)
Dept: CARDIOLOGY CLINIC | Age: 52
End: 2022-12-09

## 2022-12-09 NOTE — TELEPHONE ENCOUNTER
The chest x ray was ordered in prep for appt. with Dr. Charles Wild who is now on medical leave. I will see his CNP instead. Should I be concerned about the radiologist impression reading of chest x ray in mean time? Thank you in advance.

## 2022-12-13 NOTE — TELEPHONE ENCOUNTER
[employer]etary) message sent to patient. Problem: Patient Care Overview  Goal: Plan of Care/Patient Progress Review  Outcome: No Change  [id number]: Arrive from pacu 1700,  and dtr present. VSS, pain controlled, IS teaching done, Capno, CMS intact. Hemovac patent.  Cont to monitor.

## 2022-12-13 NOTE — TELEPHONE ENCOUNTER
Overall, I would not be too concerned but I would have this further evaluated by additional imaging. This will be ordered by pulmonary since there are different types of CT scan. I will defer it to them.  thanks

## 2022-12-23 ENCOUNTER — TELEPHONE (OUTPATIENT)
Dept: PULMONOLOGY | Age: 52
End: 2022-12-23

## 2022-12-23 ENCOUNTER — OFFICE VISIT (OUTPATIENT)
Dept: PULMONOLOGY | Age: 52
End: 2022-12-23
Payer: COMMERCIAL

## 2022-12-23 ENCOUNTER — HOSPITAL ENCOUNTER (OUTPATIENT)
Dept: CT IMAGING | Age: 52
Discharge: HOME | End: 2022-12-23
Payer: COMMERCIAL

## 2022-12-23 VITALS
BODY MASS INDEX: 28.79 KG/M2 | HEART RATE: 75 BPM | TEMPERATURE: 99.6 F | DIASTOLIC BLOOD PRESSURE: 68 MMHG | OXYGEN SATURATION: 98 % | SYSTOLIC BLOOD PRESSURE: 128 MMHG | HEIGHT: 69 IN | WEIGHT: 194.4 LBS

## 2022-12-23 DIAGNOSIS — R93.89 ABNORMAL CHEST X-RAY: ICD-10-CM

## 2022-12-23 DIAGNOSIS — J98.59 MEDIASTINAL ABNORMALITY: ICD-10-CM

## 2022-12-23 DIAGNOSIS — R06.02 SOB (SHORTNESS OF BREATH): Primary | ICD-10-CM

## 2022-12-23 DIAGNOSIS — R09.82 POST-NASAL DRIP: ICD-10-CM

## 2022-12-23 DIAGNOSIS — R05.8 OTHER COUGH: ICD-10-CM

## 2022-12-23 DIAGNOSIS — R06.02 SOB (SHORTNESS OF BREATH): ICD-10-CM

## 2022-12-23 PROCEDURE — 94010 BREATHING CAPACITY TEST: CPT | Performed by: NURSE PRACTITIONER

## 2022-12-23 PROCEDURE — G8427 DOCREV CUR MEDS BY ELIG CLIN: HCPCS | Performed by: NURSE PRACTITIONER

## 2022-12-23 PROCEDURE — 3078F DIAST BP <80 MM HG: CPT | Performed by: NURSE PRACTITIONER

## 2022-12-23 PROCEDURE — 3074F SYST BP LT 130 MM HG: CPT | Performed by: NURSE PRACTITIONER

## 2022-12-23 PROCEDURE — 71260 CT THORAX DX C+: CPT

## 2022-12-23 PROCEDURE — 95012 NITRIC OXIDE EXP GAS DETER: CPT | Performed by: NURSE PRACTITIONER

## 2022-12-23 PROCEDURE — 1036F TOBACCO NON-USER: CPT | Performed by: NURSE PRACTITIONER

## 2022-12-23 PROCEDURE — 94618 PULMONARY STRESS TESTING: CPT | Performed by: NURSE PRACTITIONER

## 2022-12-23 PROCEDURE — G8484 FLU IMMUNIZE NO ADMIN: HCPCS | Performed by: NURSE PRACTITIONER

## 2022-12-23 PROCEDURE — 99204 OFFICE O/P NEW MOD 45 MIN: CPT | Performed by: NURSE PRACTITIONER

## 2022-12-23 PROCEDURE — G8419 CALC BMI OUT NRM PARAM NOF/U: HCPCS | Performed by: NURSE PRACTITIONER

## 2022-12-23 PROCEDURE — 3017F COLORECTAL CA SCREEN DOC REV: CPT | Performed by: NURSE PRACTITIONER

## 2022-12-23 PROCEDURE — 6360000004 HC RX CONTRAST MEDICATION: Performed by: NURSE PRACTITIONER

## 2022-12-23 RX ORDER — BENZONATATE 100 MG/1
100-200 CAPSULE ORAL 3 TIMES DAILY PRN
Qty: 60 CAPSULE | Refills: 0 | Status: SHIPPED | OUTPATIENT
Start: 2022-12-23 | End: 2023-01-02

## 2022-12-23 RX ADMIN — IOPAMIDOL 80 ML: 755 INJECTION, SOLUTION INTRAVENOUS at 12:53

## 2022-12-23 ASSESSMENT — ENCOUNTER SYMPTOMS
SHORTNESS OF BREATH: 1
WHEEZING: 0
ALLERGIC/IMMUNOLOGIC NEGATIVE: 1
GASTROINTESTINAL NEGATIVE: 1
COUGH: 0
EYES NEGATIVE: 1

## 2022-12-23 NOTE — TELEPHONE ENCOUNTER
Called patient and discussed todays CT results. No acute findings from a pulmonary standpoint. Results shared with Dr. Pablito Louis.

## 2022-12-23 NOTE — PROGRESS NOTES
Patient is experiencing SOB: Yes at times     Patient is experiencing wheezing: Yes    Patient states they have had a Strong, productive = 1 cough.     Phlegm is daily, color:  light tan     Patient is coughing up blood: yes - strikes of blood    Patient has been experiencing chest pains: non-existent

## 2022-12-23 NOTE — PROGRESS NOTES
[facility]    Patient: Andreea Woody, 47 y.o.   : 2022         Subjective     Chief Complaint   Patient presents with    New Patient     New Pulm SOB with CXR 22 PFT 22 ref by Paul. HPI  Nany Archuleta is here for evaluation of shortness of breath with referral from Dr. Amber Garza. Patient here due to SOB  CXR done recently with questionable mediastinal mass vs artifact   Never a smoker   PFT done WNL  No home oxygen use   SOB with exertion mainly  Covid in  this past year no hospitalization   A lot of sinus issues lately using nasal spray daily x past month   Dr. Amber Garza following for heart; cath done recently \"clean cath\" per patient   Cough present at times thinks its coming from nasal issues   No recent weight loss   No hemoptysis   Brother has stage IV lymphoma - strong family hx of cancer     Patient is experiencing SOB: Yes at times      Patient is experiencing wheezing: Yes     Patient states they have had a Strong, productive = 1 cough. Phlegm is daily, color:  light tan      Patient is coughing up blood: yes - strikes of blood     Patient has been experiencing chest pains: non-existent     Obstructive Sleep Apnea Review Of Systems:     Sleep History:  Pt with history of snoring, feels sleepy during the day    Morning headache:No,   Dryness of mouth in the morning:No  Falls asleep in 10  minutes. Any awakenings? No  Difficulty Falling back to sleep? No  Symptoms began:  a few years ago. Previous evaluation and treatment? No    Which ones? none    Time in Bed:   Bedtime: 7p.m. Awakens  2 a.m.      Bradshaw Sleepiness Score: 0-3  Sitting and readin  Watching TV: 2  Sitting inactive in a public place: 0  Being a passenger in a motor vehicle for an hour or more: 0  Lying down in the afternoon: 2  Sitting and talking to someone: 0  Sitting quietly after lunch (no alcohol): 0  Stopped for a few minutes in traffic while driving;0  Total Score:   4    Naps:  Any naps? Yes and are they helpful Yes    Snoring and Apneas:  Do you snore or been told you a snore? Yes  How long have known about your snoring? months  Any witnessed apneas? No  Any awakenings with choking or gasping? No    Dreams:  Any recurring dreams? No  Hallucinations? No  Sleep Paralysis? No  Symptoms of Cataplexy? No    Bruxism: No  History of head injury: Yes- concussion this summer no hx of TBIs    Driving History:  Do you have a CDL or drive long distances for work? No  Any driving accidents in the past year? No  Any sleepiness while driving? No    Weight:  Any change in weight over the past year? Yes   How about past 5 years? Yes  How much? 10    Mallampati airway Class:III  Neck Circumference: 15 inches    Caffeine Intake: How much soda (pop), coffee, tea, power drinks do you ingest per day? 10 per day. Patient considerations: Wheelchair, Angy Friends, Hearing deficit, Claustrophobic, MDD, Blind, Para/Quadraplegic,     -She was instructed to not to drive any motor vehicles or operate heavy equipment until Her sleep symptoms are under good control. She verbalizes understanding. Onset Duration  Exacerbating factors- exertion   Alleviating factors- rest helps a little   Timing-exertion, certain times of day- NO  Associated symptoms- left anterior slight pressure     Patient was never diagnosed with chronic respiratory condition ie asthma, COPD, or ILD. Patient has not been admitted or treated for pneumonia, pulmonary embolism, or respiratory failure. Patient has not been intubated for reasons other than planned procedures. Social History  Patient job history included  She has had exposure to aerosolized particles or hazardous fumes. 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   denies to active tobacco smoking 0 PPD for 0 years for 0 pack years   denies exposure to pets/animals at home. denies exposure to tuberculosis.   denies hobbies they can no longer perform due to shortness of breath    Flu vaccine- NO  Pneumonia vaccine- NO  Covid vaccine- done x 2 plus one booster   Past Medical hx   PMH:  Past Medical History:   Diagnosis Date    Acid reflux     High cholesterol 11/06/2018    Hypercholesteremia     Hypertension      SURGICAL HISTORY:  Past Surgical History:   Procedure Laterality Date    DENTAL SURGERY      DILATION AND CURETTAGE OF UTERUS      FINGER SURGERY Right 2021    FOOT SURGERY Left 02/2021    FOOT RECONSTRUCTION    TONSILLECTOMY       SOCIAL HISTORY:  Social History     Tobacco Use    Smoking status: Never    Smokeless tobacco: Never   Vaping Use    Vaping Use: Never used   Substance Use Topics    Alcohol use: Yes     Comment: occasionally    Drug use: No     ALLERGIES:  Allergies   Allergen Reactions    Wasp Venom Anaphylaxis    Hornet Venom     Shellfish-Derived Products Nausea Only and Rash     FAMILY HISTORY:  Family History   Problem Relation Age of Onset    High Blood Pressure Mother     Atrial Fibrillation Mother     High Blood Pressure Father     Heart Disease Father      CURRENT MEDICATIONS:  Current Outpatient Medications   Medication Sig Dispense Refill    benzonatate (TESSALON) 100 MG capsule Take 1-2 capsules by mouth 3 times daily as needed for Cough 60 capsule 0    norethindrone (AYGESTIN) 5 MG tablet Take 5 mg by mouth daily      Probiotic Product (PROBIOTIC DAILY PO) Take by mouth daily      cetirizine (ZYRTEC) 10 MG tablet Take 10 mg by mouth daily      Cholecalciferol (VITAMIN D3) 50 MCG (2000 UT) CAPS Take by mouth daily      TURMERIC PO Take 1,500 mg by mouth daily      carvedilol (COREG) 3.125 MG tablet Take 3.125 mg by mouth 2 times daily (with meals)      omeprazole (PRILOSEC) 20 MG delayed release capsule Take 20 mg by mouth daily      atorvastatin (LIPITOR) 20 MG tablet Take 20 mg by mouth daily       No current facility-administered medications for this visit. Aliya HARRELL   Review of Systems   Constitutional:  Positive for appetite change and fatigue. Negative for chills and fever. HENT:  Positive for postnasal drip. Negative for congestion. Eyes: Negative. Respiratory:  Positive for shortness of breath. Negative for cough and wheezing. Cardiovascular:  Positive for palpitations. Negative for chest pain and leg swelling. Gastrointestinal: Negative. Endocrine: Negative. Genitourinary: Negative. Musculoskeletal: Negative. Allergic/Immunologic: Negative. Neurological: Negative. Hematological: Negative. Psychiatric/Behavioral: Negative. Negative for sleep disturbance. Physical exam   /68   Pulse 75   Temp 99.6 °F (37.6 °C)   Ht 5' 9\" (1.753 m)   Wt 194 lb 6.4 oz (88.2 kg)   SpO2 98%   BMI 28.71 kg/m²    Wt Readings from Last 3 Encounters:   12/23/22 194 lb 6.4 oz (88.2 kg)   11/14/22 180 lb (81.6 kg)   11/02/22 182 lb (82.6 kg)       Physical Exam  Vitals and nursing note reviewed. Constitutional:       Appearance: She is well-developed. HENT:      Head: Normocephalic and atraumatic. Eyes:      Conjunctiva/sclera: Conjunctivae normal.      Pupils: Pupils are equal, round, and reactive to light. Neck:      Vascular: No JVD. Cardiovascular:      Rate and Rhythm: Normal rate and regular rhythm. Heart sounds: Normal heart sounds. No murmur heard. No friction rub. No gallop. Pulmonary:      Effort: Pulmonary effort is normal. No respiratory distress. Breath sounds: Normal breath sounds. No wheezing or rales. Abdominal:      General: Bowel sounds are normal.      Palpations: Abdomen is soft. Musculoskeletal:         General: Normal range of motion. Cervical back: Normal range of motion and neck supple. Skin:     General: Skin is warm and dry.       Capillary Refill: Capillary refill takes less than 2 seconds. Neurological:      Mental Status: She is alert and oriented to person, place, and time. Psychiatric:         Behavior: Behavior normal.         Thought Content: Thought content normal.         Judgment: Judgment normal.        results   Lung Nodule Screening     [] Qualifies    [x] Does not qualify   [] Declined    [] Completed     [facility] for lung cancer with low-dose computed tomography (LDCT) in adults aged 48 to [de-identified] years who have a 20 pack-year smoking history and currently smoke or have quit within the past 15 years. Screeningshould be discontinued once a person has not smoked for 15 years or develops a health problem that substantially limits life expectancy or the ability or willingness to have curative lung surgery. 12/1/2022   PA and lateral chest x-ray       FINDINGS: The cardiomediastinal silhouette appears within normal limits. No focal consolidation, pleural effusion or pneumothorax is seen. No acute osseous abnormalities are demonstrated. There is flattening of the diaphragm on the lateral projection suggesting mild hyperinflation. There is diffuse osteopenia noted. There is a slight concave contour of the distal left tracheal contour which may be Projectional/artifactual in nature. However, mass effect from an adjacent mediastinal structure is not excluded. If clinically indicated, this can be further evaluated with contrast-enhanced chest CT. 1. There is a slight concave contour of the distal left tracheal contour which may be Projectional/artifactual in nature. However, mass effect from an adjacent mediastinal structure is not excluded. If clinically indicated, this can be further evaluated with contrast-enhanced chest CT. 2. No other acute cardiopulmonary disease. Six Minute Walk Test  Chelo Dyson 1970    Six minute walk test done in my office today by my medical assistant.  Nicolasa's oxygen saturation at rest on room air was 97%. Her oxygen saturation dropped to 97% on room air with exertion after walking 1188 feet and within 6 minutes. Patient ambulated a total of 1188 feet with oxygen. Resting Dyspnea/Monster score was 2 / 4  and 1 / 1  upon completion of the walk. Resting heart rate was  83 bpm and 90 bpm upon completing the walk. 11/14/2022   CARDIAC CATHETERIZATION     CORONARY ANGIOGRAM:  1. Right coronary artery appears to be the nondominant system. It is  overall patent with mild luminal irregularities in the distal portions  of the vessel. 2.  Left main is short, but patent, gives rise to LAD and left  circumflex arteries. 3.  Left circumflex artery is a large vessel. It is overall patent with  no significant disease in the proximal, mid, and distal portions of the  vessel. There is a large OM1 vessel, which is overall patent without  any significant disease. 4. LAD is patent in the proximal portion. In the mid portion, there is  a mild myocardial bridging, otherwise the vessel beyond that is overall  patent with no significant disease. LVEDP was measured to be 2 mmHg. There is no significant gradient  across the aortic valve, and LV systolic function is estimated at 60%. The patient tolerated the procedure well. There were no immediate  complications. Hemostasis of the right radial artery was achieved with  the Vasc Band device. She was transferred to her room in stable  condition. SUMMARY:  1. Patent coronaries. 2.  LVEDP was measured to be 2 mmHg. 3.  LV systolic function was roughly estimated at 60%. Assessment      Diagnosis Orders   1. SOB (shortness of breath)  6 Minute Walk Test    POCT Nitric Oxide    [facility]    CT FACIAL BONES WO CONTRAST    CT CHEST W CONTRAST      2. Abnormal chest x-ray  Creatinine    CT CHEST W CONTRAST      3. Mediastinal abnormality  Creatinine    CT CHEST W CONTRAST      4.  Post-nasal [facility]    CT FACIAL BONES WO CONTRAST      5. Other cough  benzonatate (TESSALON) 100 MG capsule            Acute: asthma, COPD, LRTI, PE, PTX, panic attacks/psychosomatic, metabolic acidosis  Chronic: COPD, ILD, PEF, Bronchiectasis, chronic infection(TB), Heart failure, chronic arrhythmia, anemia, thyroid disease    Plan   -6MWT today in the office no oxygen needed with rest or activity   -NIOX done in the office no significant airway inflammation  -PFT done WNL  -ENT referral today for ongoing sinus issues   -CXR reviewed with questionable findings will order CT Chest w/contrast - ordered STAT due to SOB and ?  Mass - patient very anxious as well   -CT facial bones ordered for sinus issues to be done prior to ENT visit   -Advised to maintain pneumonia vaccine with PCP and to take flu vaccine this coming season.  -Advised patient to call office with any changes, questions, or concerns regarding respiratory status  -Sleep questions completed- may due PSG study at later date    Will see Rebecca Rivera in: 1-2 weeks     Electronically signed by RAJ Hooper CNP on 12/23/2022 at 1:01 PM

## 2022-12-28 ENCOUNTER — PATIENT MESSAGE (OUTPATIENT)
Dept: CARDIOLOGY CLINIC | Age: 52
End: 2022-12-28

## 2022-12-28 ENCOUNTER — TELEPHONE (OUTPATIENT)
Dept: CARDIOLOGY CLINIC | Age: 52
End: 2022-12-28

## 2022-12-28 DIAGNOSIS — R06.02 SHORTNESS OF BREATH: Primary | ICD-10-CM

## 2022-12-28 DIAGNOSIS — I72.9 ANEURYSM (HCC): ICD-10-CM

## 2022-12-28 NOTE — TELEPHONE ENCOUNTER
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 I understand that they may not correct it until it reaches a certain size/growth rate, however, I wish to start and end any care for it at their facility. I am still short of breath and the chest x-ray referenced possible mass effect on trachea. I am requesting that the referral be completed before my scheduled appointment with you on Jan. 6. If you have no preference on facility, we are more familiar with [facility]. Please do not hesitate to contact me for questions and to notify me of process steps done. I appreciate your guidance in this situation.       Thank you, Roseline Phelps  12/28/22   [id number]

## 2023-01-04 ENCOUNTER — HOSPITAL ENCOUNTER (OUTPATIENT)
Dept: CT IMAGING | Age: 53
Discharge: HOME | End: 2023-01-04
Payer: COMMERCIAL

## 2023-01-04 DIAGNOSIS — R06.02 SOB (SHORTNESS OF BREATH): ICD-10-CM

## 2023-01-04 DIAGNOSIS — R09.82 POST-NASAL DRIP: ICD-10-CM

## 2023-01-04 PROCEDURE — 70486 CT MAXILLOFACIAL W/O DYE: CPT

## 2023-01-06 ENCOUNTER — OFFICE VISIT (OUTPATIENT)
Dept: CARDIOLOGY CLINIC | Age: 53
End: 2023-01-06
Payer: COMMERCIAL

## 2023-01-06 VITALS
DIASTOLIC BLOOD PRESSURE: 70 MMHG | HEIGHT: 69 IN | HEART RATE: 80 BPM | BODY MASS INDEX: 29.03 KG/M2 | SYSTOLIC BLOOD PRESSURE: 120 MMHG | WEIGHT: 196 LBS

## 2023-01-06 DIAGNOSIS — R06.00 DYSPNEA, UNSPECIFIED TYPE: Primary | ICD-10-CM

## 2023-01-06 PROCEDURE — 3017F COLORECTAL CA SCREEN DOC REV: CPT | Performed by: INTERNAL MEDICINE

## 2023-01-06 PROCEDURE — 3074F SYST BP LT 130 MM HG: CPT | Performed by: INTERNAL MEDICINE

## 2023-01-06 PROCEDURE — 1036F TOBACCO NON-USER: CPT | Performed by: INTERNAL MEDICINE

## 2023-01-06 PROCEDURE — 3078F DIAST BP <80 MM HG: CPT | Performed by: INTERNAL MEDICINE

## 2023-01-06 PROCEDURE — G8484 FLU IMMUNIZE NO ADMIN: HCPCS | Performed by: INTERNAL MEDICINE

## 2023-01-06 PROCEDURE — G8419 CALC BMI OUT NRM PARAM NOF/U: HCPCS | Performed by: INTERNAL MEDICINE

## 2023-01-06 PROCEDURE — 99214 OFFICE O/P EST MOD 30 MIN: CPT | Performed by: INTERNAL MEDICINE

## 2023-01-06 PROCEDURE — G8427 DOCREV CUR MEDS BY ELIG CLIN: HCPCS | Performed by: INTERNAL MEDICINE

## 2023-01-06 NOTE — PROGRESS NOTES
Heart cath follow up. C/o chest pressure on the left side of the chest, sob with activity, occasional dizziness and lightheaded with activity, palpitations.

## 2023-01-06 NOTE — PROGRESS NOTES
[address] [address]  Dept: [phone]  Dept Fax: [phone]  Loc: [phone]    Visit Date: 1/6/2023    Ms. Nely Rowland is a 46 y.o. female  who presented for:  Chief Complaint   Patient presents with    Follow Up After Procedure       HPI:   45 yo F c hx of HTN, HLD is here to establish cardiology. Patient had been following up with Dr. Alana Jones at [facility] but wants to transfer care  here. She reports significant dyspnea on exertion. 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 Patient had [place] [facility] in 06/2022. Continues to have fatigue and shortness of breath. Sleeps intermittently but does sleep of 7 hrs. Underwent stress testing at [facility] which showed basal to mid inferiolateral perfusion defect. She is here for a follow up. On the CT scan there was some ectasia of the aorta which she wants to be referred to [facility]. Current Outpatient Medications:     norethindrone (AYGESTIN) 5 MG tablet, Take 5 mg by mouth daily, Disp: , Rfl:     Probiotic Product (PROBIOTIC DAILY PO), Take by mouth daily, Disp: , Rfl:     cetirizine (ZYRTEC) 10 MG tablet, Take 10 mg by mouth daily, Disp: , Rfl:     Cholecalciferol (VITAMIN D3) 50 MCG (2000 UT) CAPS, Take by mouth daily, Disp: , Rfl:     TURMERIC PO, Take 1,500 mg by mouth daily, Disp: , Rfl:     carvedilol (COREG) 3.125 MG tablet, Take 3.125 mg by mouth 2 times daily (with meals), Disp: , Rfl:     omeprazole (PRILOSEC) 20 MG delayed release capsule, Take 20 mg by mouth daily, Disp: , Rfl:     atorvastatin (LIPITOR) 20 MG tablet, Take 20 mg by mouth daily, Disp: , Rfl:     Past Medical History  Chaitanya Bosch  has a past medical history of Acid reflux, High cholesterol, Hypercholesteremia, and Hypertension. Social History  Chaitanya Bosch  reports that she has never smoked. She has never used smokeless tobacco. She reports current alcohol use.  She reports that she does not use drugs. Family History  Nicolasa family history includes Atrial Fibrillation in her mother; Heart Disease in her father; High Blood Pressure in her father and mother. Past Surgical History   Past Surgical History:   Procedure Laterality Date    DENTAL SURGERY      DILATION AND CURETTAGE OF UTERUS      FINGER SURGERY Right 2021    FOOT SURGERY Left 02/2021    FOOT RECONSTRUCTION    TONSILLECTOMY         Subjective:     REVIEW OF SYSTEMS  Constitutional: denies sweats, chills and fever  HENT: denies  congestion, sinus pressure, sneezing and sore throat. Eyes: denies  pain, discharge, redness and itching. Respiratory: denies apnea, cough  Gastrointestinal: denies blood in stool, constipation, diarrhea   Endocrine: denies cold intolerance, heat intolerance, polydipsia. Genitourinary: denies dysuria, enuresis, flank pain and hematuria. Musculoskeletal: denies arthralgias, joint swelling and neck pain. Neurological: denies numbness and headaches. Psychiatric/Behavioral: denies agitation, confusion, decreased concentration and dysphoric mood    All others reviewed and are negative. Objective:     /70   Pulse 80   Ht 5' 9\" (1.753 m)   Wt 196 lb (88.9 kg)   BMI 28.94 kg/m²     Wt Readings from Last 3 Encounters:   01/06/23 196 lb (88.9 kg)   12/23/22 194 lb 6.4 oz (88.2 kg)   11/14/22 180 lb (81.6 kg)     BP Readings from Last 3 Encounters:   01/06/23 120/70   12/23/22 128/68   11/14/22 124/86       PHYSICAL EXAM  Constitutional: Oriented to person, place, and time. Appears well-developed and well-nourished. HENT:   Head: Normocephalic and atraumatic. Eyes: EOM are normal. Pupils are equal, round, and reactive to light. Neck: Normal range of motion. Neck supple. No JVD present. Cardiovascular: Normal rate , normal heart sounds and intact distal pulses. Pulmonary/Chest: Effort normal and breath sounds normal. No respiratory distress. No wheezes. No rales. Abdominal: Soft.  Bowel sounds are normal. No distension. There is no tenderness. Musculoskeletal: Normal range of motion. No edema. Neurological: Alert and oriented to person, place, and time. No cranial nerve deficit. Coordination normal.   Skin: Skin is warm and dry. Psychiatric: Normal mood and affect.        No results found for: CKTOTAL, CKMB, CKMBINDEX    Lab Results   Component Value Date/Time    WBC 7.6 11/14/2022 11:30 AM    RBC 4.61 11/14/2022 11:30 AM    HGB 14.2 11/14/2022 11:30 AM    HCT 42.3 11/14/2022 11:30 AM    MCV 91.8 11/14/2022 11:30 AM    MCH 30.8 11/14/2022 11:30 AM    MCHC 33.6 11/14/2022 11:30 AM    RDW 13.0 10/24/2022 08:02 AM     11/14/2022 11:30 AM    MPV 9.4 11/14/2022 11:30 AM       Lab Results   Component Value Date/Time     11/14/2022 11:31 AM    K 4.0 11/14/2022 11:31 AM     11/14/2022 11:31 AM    CO2 21 11/14/2022 11:31 AM    BUN 13 11/14/2022 11:31 AM    LABALBU 4.3 10/24/2022 08:02 AM    CREATININE 0.6 11/14/2022 11:31 AM    CALCIUM 9.5 11/14/2022 11:31 AM    LABGLOM >60 11/14/2022 11:00 AM    GLUCOSE 97 11/14/2022 11:31 AM    GLUCOSE 91 10/24/2022 08:02 AM       Lab Results   Component Value Date/Time    ALKPHOS 75 10/24/2022 08:02 AM    ALT 12 10/24/2022 08:02 AM    AST 17 10/24/2022 08:02 AM    PROT 6.4 10/24/2022 08:02 AM    PROT 6.2 10/03/2019 12:00 AM    BILITOT 0.6 10/24/2022 08:02 AM    BILIDIR 0.1 08/26/2022 08:15 AM    LABALBU 4.3 10/24/2022 08:02 AM       Lab Results   Component Value Date/Time    MG 1.8 10/24/2022 08:02 AM       Lab Results   Component Value Date    INR 0.98 11/14/2022         Lab Results   Component Value Date/Time    LABA1C 5.1 11/14/2022 11:30 AM       Lab Results   Component Value Date/Time    TRIG 79 11/14/2022 11:31 AM    HDL 52 11/14/2022 11:31 AM    LDLCALC 88 11/14/2022 11:31 AM    LDLDIRECT 79 10/24/2022 08:02 AM       Lab Results   Component Value Date/Time    TSH 1.493 01/14/2021 08:34 AM         Testing Reviewed:      I haveindividually reviewed the below cardiac tests    EKG:    ECHO: No results found for this or any previous visit. STRESS:    CATH:    Assessment/Plan       Diagnosis Orders   1. Dyspnea, unspecified type            Shortness of breath, mild  COVID19 infection in 6/2022  HTN  HLD  Abnormal stress testing at [facility]  Aortic ectasia    Will refer to [facility] CTS due to patients preference for aortic ectasia. Cath showed no significant disease  No ASD visualized on cardiac MRI  Labs reviewed  Working a lot of hours  The patient is asked to make an attempt to improve diet and exercise patterns to aid in medical management of this problem. Advised more plant based nutrition/meditarrean diet   Advised patient to call office or seek immediate medical attention if there is any new onset of  any chest pain, sob, palpitations, lightheadedness, dizziness, orthopnea, PND or pedal edema. All medication side effects were discussed in details. Thank youfor allowing me to participate in the care of this patient. Please do not hesitate to contact me for any further questions. Return if symptoms worsen or fail to improve, for Review testing, Regular follow up.        Electronically signed by Salina Rao MD [facility]  1/6/2023 at 8:04 AM EDT

## 2023-03-07 ENCOUNTER — NURSE ONLY (OUTPATIENT)
Dept: LAB | Age: 53
End: 2023-03-07

## 2023-03-15 LAB — CYTOLOGY THIN PREP PAP: NORMAL

## 2023-10-31 SDOH — ECONOMIC STABILITY: INCOME INSECURITY: HOW HARD IS IT FOR YOU TO PAY FOR THE VERY BASICS LIKE FOOD, HOUSING, MEDICAL CARE, AND HEATING?: NOT HARD AT ALL

## 2023-10-31 SDOH — ECONOMIC STABILITY: FOOD INSECURITY: WITHIN THE PAST 12 MONTHS, THE FOOD YOU BOUGHT JUST DIDN'T LAST AND YOU DIDN'T HAVE MONEY TO GET MORE.: NEVER TRUE

## 2023-10-31 SDOH — ECONOMIC STABILITY: FOOD INSECURITY: WITHIN THE PAST 12 MONTHS, YOU WORRIED THAT YOUR FOOD WOULD RUN OUT BEFORE YOU GOT MONEY TO BUY MORE.: NEVER TRUE

## 2023-10-31 SDOH — ECONOMIC STABILITY: HOUSING INSECURITY
IN THE LAST 12 MONTHS, WAS THERE A TIME WHEN YOU DID NOT HAVE A STEADY PLACE TO SLEEP OR SLEPT IN A SHELTER (INCLUDING NOW)?: NO

## 2023-10-31 SDOH — ECONOMIC STABILITY: TRANSPORTATION INSECURITY
IN THE PAST 12 MONTHS, HAS LACK OF TRANSPORTATION KEPT YOU FROM MEETINGS, WORK, OR FROM GETTING THINGS NEEDED FOR DAILY LIVING?: NO

## 2023-10-31 ASSESSMENT — PATIENT HEALTH QUESTIONNAIRE - PHQ9
SUM OF ALL RESPONSES TO PHQ QUESTIONS 1-9: 0
2. FEELING DOWN, DEPRESSED OR HOPELESS: NOT AT ALL
1. LITTLE INTEREST OR PLEASURE IN DOING THINGS: 0
SUM OF ALL RESPONSES TO PHQ9 QUESTIONS 1 & 2: 0
SUM OF ALL RESPONSES TO PHQ9 QUESTIONS 1 & 2: 0
SUM OF ALL RESPONSES TO PHQ QUESTIONS 1-9: 0
SUM OF ALL RESPONSES TO PHQ QUESTIONS 1-9: 0
1. LITTLE INTEREST OR PLEASURE IN DOING THINGS: NOT AT ALL
SUM OF ALL RESPONSES TO PHQ QUESTIONS 1-9: 0
2. FEELING DOWN, DEPRESSED OR HOPELESS: 0

## 2023-11-02 ENCOUNTER — OFFICE VISIT (OUTPATIENT)
Dept: FAMILY MEDICINE CLINIC | Age: 53
End: 2023-11-02
Payer: COMMERCIAL

## 2023-11-02 VITALS
DIASTOLIC BLOOD PRESSURE: 88 MMHG | WEIGHT: 191 LBS | BODY MASS INDEX: 28.29 KG/M2 | HEART RATE: 72 BPM | HEIGHT: 69 IN | SYSTOLIC BLOOD PRESSURE: 138 MMHG | RESPIRATION RATE: 18 BRPM | OXYGEN SATURATION: 98 %

## 2023-11-02 DIAGNOSIS — Z12.11 COLON CANCER SCREENING: ICD-10-CM

## 2023-11-02 DIAGNOSIS — Z00.00 ENCOUNTER FOR WELL ADULT EXAM WITHOUT ABNORMAL FINDINGS: Primary | ICD-10-CM

## 2023-11-02 DIAGNOSIS — R09.81 SINUS CONGESTION: ICD-10-CM

## 2023-11-02 DIAGNOSIS — I77.819 ECTASIS AORTA (HCC): ICD-10-CM

## 2023-11-02 DIAGNOSIS — R53.83 FATIGUE, UNSPECIFIED TYPE: ICD-10-CM

## 2023-11-02 DIAGNOSIS — E78.00 HIGH CHOLESTEROL: ICD-10-CM

## 2023-11-02 DIAGNOSIS — Z12.31 ENCOUNTER FOR SCREENING MAMMOGRAM FOR MALIGNANT NEOPLASM OF BREAST: ICD-10-CM

## 2023-11-02 DIAGNOSIS — I10 ESSENTIAL HYPERTENSION: ICD-10-CM

## 2023-11-02 PROCEDURE — 1036F TOBACCO NON-USER: CPT | Performed by: NURSE PRACTITIONER

## 2023-11-02 PROCEDURE — G8419 CALC BMI OUT NRM PARAM NOF/U: HCPCS | Performed by: NURSE PRACTITIONER

## 2023-11-02 PROCEDURE — 3074F SYST BP LT 130 MM HG: CPT | Performed by: NURSE PRACTITIONER

## 2023-11-02 PROCEDURE — 3017F COLORECTAL CA SCREEN DOC REV: CPT | Performed by: NURSE PRACTITIONER

## 2023-11-02 PROCEDURE — G8484 FLU IMMUNIZE NO ADMIN: HCPCS | Performed by: NURSE PRACTITIONER

## 2023-11-02 PROCEDURE — 99386 PREV VISIT NEW AGE 40-64: CPT | Performed by: NURSE PRACTITIONER

## 2023-11-02 PROCEDURE — G8427 DOCREV CUR MEDS BY ELIG CLIN: HCPCS | Performed by: NURSE PRACTITIONER

## 2023-11-02 PROCEDURE — 3078F DIAST BP <80 MM HG: CPT | Performed by: NURSE PRACTITIONER

## 2023-11-02 PROCEDURE — 99213 OFFICE O/P EST LOW 20 MIN: CPT | Performed by: NURSE PRACTITIONER

## 2023-11-02 RX ORDER — AMOXICILLIN AND CLAVULANATE POTASSIUM 875; 125 MG/1; MG/1
1 TABLET, FILM COATED ORAL 2 TIMES DAILY
Qty: 14 TABLET | Refills: 0 | Status: SHIPPED | OUTPATIENT
Start: 2023-11-02 | End: 2023-11-09

## 2023-11-02 RX ORDER — FLUTICASONE PROPIONATE 50 MCG
SPRAY, SUSPENSION (ML) NASAL
Qty: 48 G | Refills: 3 | Status: SHIPPED | OUTPATIENT
Start: 2023-11-02 | End: 2024-11-01

## 2023-11-02 RX ORDER — CARVEDILOL 3.12 MG/1
3.12 TABLET ORAL 2 TIMES DAILY WITH MEALS
Qty: 180 TABLET | Refills: 3 | Status: SHIPPED | OUTPATIENT
Start: 2023-11-02 | End: 2024-11-01

## 2023-11-02 RX ORDER — FLUTICASONE PROPIONATE 50 MCG
1 SPRAY, SUSPENSION (ML) NASAL DAILY
Qty: 16 G | Refills: 0 | Status: SHIPPED | OUTPATIENT
Start: 2023-11-02 | End: 2023-11-02

## 2023-11-02 RX ORDER — ATORVASTATIN CALCIUM 20 MG/1
20 TABLET, FILM COATED ORAL DAILY
Qty: 90 TABLET | Refills: 3 | Status: SHIPPED | OUTPATIENT
Start: 2023-11-02 | End: 2024-11-01

## 2023-11-02 ASSESSMENT — ENCOUNTER SYMPTOMS
EYE ITCHING: 0
SHORTNESS OF BREATH: 0
CONSTIPATION: 0
ABDOMINAL PAIN: 0
CHEST TIGHTNESS: 0
WHEEZING: 0
ABDOMINAL DISTENTION: 0
COUGH: 0
BACK PAIN: 0
CHOKING: 0
VOICE CHANGE: 0
COLOR CHANGE: 0
NAUSEA: 0
VOMITING: 0
EYE PAIN: 0
EYE DISCHARGE: 0

## 2023-11-02 NOTE — PROGRESS NOTES
Well Adult Note  Name: Jesusita Hopson Date: 2023   MRN: [id number] Sex: Female   Age: 46 y.o. Ethnicity: Non- / Non    : 1970 Race: White (non-)      Holley Cerrato is here for well adult exam.  History:   has a past medical history of Acid reflux, High cholesterol, Hypercholesteremia, Hypertension, and Restless legs syndrome. March went to [facility] had a cardiac CT        Review of Systems   Constitutional:  Positive for fatigue. Negative for appetite change, chills and fever. HENT:  Positive for congestion and sinus pressure. Negative for ear discharge, tinnitus and voice change. Eyes:  Negative for pain, discharge, itching and visual disturbance. Respiratory:  Negative for cough, choking, chest tightness, shortness of breath and wheezing. Cardiovascular:  Negative for chest pain, palpitations and leg swelling. Gastrointestinal:  Negative for abdominal distention, abdominal pain, constipation, nausea and vomiting. Endocrine: Negative for cold intolerance and heat intolerance. Genitourinary:  Negative for dysuria, hematuria, vaginal discharge and vaginal pain. Musculoskeletal:  Negative for arthralgias, back pain, gait problem, neck pain and neck stiffness. Skin:  Negative for color change and rash. Neurological:  Negative for dizziness, syncope, speech difficulty, light-headedness, numbness and headaches. Psychiatric/Behavioral:  Negative for behavioral problems, confusion, self-injury and suicidal ideas. The patient is not nervous/anxious. Allergies   Allergen Reactions    Wasp Venom Anaphylaxis    Hornet Venom     Shellfish-Derived Products Nausea Only and Rash         Prior to Visit Medications    Medication Sig Taking?  Authorizing Provider   carvedilol (COREG) 3.125 MG tablet Take 1 tablet by mouth 2 times daily (with meals) Yes Siri Driver APRN - CNP   atorvastatin (LIPITOR) 20 MG tablet Take 1 tablet by mouth daily Yes

## 2023-11-02 NOTE — PROGRESS NOTES
Health Maintenance Due   Topic Date Due    Hepatitis B vaccine (1 of 3 - 3-dose series) Never done    DTaP/Tdap/Td vaccine (5 - Tdap) [birth year]/04/1980    HIV screen  Never done    Hepatitis C screen  Never done    Colorectal Cancer Screen  Never done    Shingles vaccine (1 of 2) Never done    COVID-19 Vaccine (4 - Pfizer series) 03/13/2022    Flu vaccine (1) Never done    Lipids  11/14/2023 Sinusitis: Care Instructions  Your Care Instructions    Sinusitis is an infection of the lining of the sinus cavities in your head. Sinusitis often follows a cold. It causes pain and pressure in your head and face. In most cases, sinusitis gets better on its own in 1 to 2 weeks. But some mild symptoms may last for several weeks. Sometimes antibiotics are needed. Follow-up care is a key part of your treatment and safety. Be sure to make and go to all appointments, and call your doctor if you are having problems. It's also a good idea to know your test results and keep a list of the medicines you take. How can you care for yourself at home? · Take an over-the-counter pain medicine, such as acetaminophen (Tylenol), ibuprofen (Advil, Motrin), or naproxen (Aleve). Read and follow all instructions on the label. · If the doctor prescribed antibiotics, take them as directed. Do not stop taking them just because you feel better. You need to take the full course of antibiotics. · Be careful when taking over-the-counter cold or flu medicines and Tylenol at the same time. Many of these medicines have acetaminophen, which is Tylenol. Read the labels to make sure that you are not taking more than the recommended dose. Too much acetaminophen (Tylenol) can be harmful. · Breathe warm, moist air from a steamy shower, a hot bath, or a sink filled with hot water. Avoid cold, dry air. Using a humidifier in your home may help. Follow the directions for cleaning the machine. · Use saline (saltwater) nasal washes to help keep your nasal passages open and wash out mucus and bacteria. You can buy saline nose drops at a grocery store or drugstore. Or you can make your own at home by adding 1 teaspoon of salt and 1 teaspoon of baking soda to 2 cups of distilled water. If you make your own, fill a bulb syringe with the solution, insert the tip into your nostril, and squeeze gently. Missael Bees your nose.   · Put a hot, wet towel or a warm gel pack on your face 3 or 4 times a day for 5 to 10 minutes each time. · Try a decongestant nasal spray like oxymetazoline (Afrin). Do not use it for more than 3 days in a row. Using it for more than 3 days can make your congestion worse. When should you call for help? Call your doctor now or seek immediate medical care if:  ? · You have new or worse swelling or redness in your face or around your eyes. ? · You have a new or higher fever. ? Watch closely for changes in your health, and be sure to contact your doctor if:  ? · You have new or worse facial pain. ? · The mucus from your nose becomes thicker (like pus) or has new blood in it. ? · You are not getting better as expected. Where can you learn more? Go to http://joslyn-josy.info/. Enter M723 in the search box to learn more about \"Sinusitis: Care Instructions. \"  Current as of: May 12, 2017  Content Version: 11.4  © 5933-8056 [employer]. Care instructions adapted under license by [employer] (which disclaims liability or warranty for this information). If you have questions about a medical condition or this instruction, always ask your healthcare professional. [address] any warranty or liability for your use of this information.

## 2023-11-02 NOTE — TELEPHONE ENCOUNTER
Patient's last appointment was : 11/2/2023  Patient's next appointment is : Visit date not found      Pt requesting a 80 day supply

## 2023-11-06 ASSESSMENT — ENCOUNTER SYMPTOMS: SINUS PRESSURE: 1

## 2023-11-16 ENCOUNTER — NURSE ONLY (OUTPATIENT)
Dept: LAB | Age: 53
End: 2023-11-16

## 2023-11-16 DIAGNOSIS — E78.00 HIGH CHOLESTEROL: ICD-10-CM

## 2023-11-16 DIAGNOSIS — R53.83 FATIGUE, UNSPECIFIED TYPE: ICD-10-CM

## 2023-11-16 LAB
25(OH)D3 SERPL-MCNC: 54 NG/ML (ref 30–100)
ANION GAP SERPL CALC-SCNC: 13 MEQ/L (ref 8–16)
BASOPHILS ABSOLUTE: 0.1 THOU/MM3 (ref 0–0.1)
BASOPHILS NFR BLD AUTO: 1.3 %
BUN SERPL-MCNC: 12 MG/DL (ref 7–22)
CALCIUM SERPL-MCNC: 9.2 MG/DL (ref 8.5–10.5)
CHLORIDE SERPL-SCNC: 106 MEQ/L (ref 98–111)
CHOLESTEROL, FASTING: 138 MG/DL (ref 100–199)
CO2 SERPL-SCNC: 24 MEQ/L (ref 23–33)
CREAT SERPL-MCNC: 0.8 MG/DL (ref 0.4–1.2)
DEPRECATED RDW RBC AUTO: 42.4 FL (ref 35–45)
EOSINOPHIL NFR BLD AUTO: 3.2 %
EOSINOPHILS ABSOLUTE: 0.2 THOU/MM3 (ref 0–0.4)
ERYTHROCYTE [DISTWIDTH] IN BLOOD BY AUTOMATED COUNT: 12.3 % (ref 11.5–14.5)
GFR SERPL CREATININE-BSD FRML MDRD: > 60 ML/MIN/1.73M2
GLUCOSE SERPL-MCNC: 95 MG/DL (ref 70–108)
HCT VFR BLD AUTO: 42.9 % (ref 37–47)
HDLC SERPL-MCNC: 43 MG/DL
HGB BLD-MCNC: 13.8 GM/DL (ref 12–16)
IMM GRANULOCYTES # BLD AUTO: 0.04 THOU/MM3 (ref 0–0.07)
IMM GRANULOCYTES NFR BLD AUTO: 0.6 %
LDLC SERPL CALC-MCNC: 82 MG/DL
LYMPHOCYTES ABSOLUTE: 1.8 THOU/MM3 (ref 1–4.8)
LYMPHOCYTES NFR BLD AUTO: 27.9 %
MCH RBC QN AUTO: 30.2 PG (ref 26–33)
MCHC RBC AUTO-ENTMCNC: 32.2 GM/DL (ref 32.2–35.5)
MCV RBC AUTO: 93.9 FL (ref 81–99)
MONOCYTES ABSOLUTE: 0.7 THOU/MM3 (ref 0.4–1.3)
MONOCYTES NFR BLD AUTO: 11 %
NEUTROPHILS NFR BLD AUTO: 56 %
NRBC BLD AUTO-RTO: 0 /100 WBC
PLATELET # BLD AUTO: 341 THOU/MM3 (ref 130–400)
PMV BLD AUTO: 11.5 FL (ref 9.4–12.4)
POTASSIUM SERPL-SCNC: 4.2 MEQ/L (ref 3.5–5.2)
RBC # BLD AUTO: 4.57 MILL/MM3 (ref 4.2–5.4)
SEGMENTED NEUTROPHILS ABSOLUTE COUNT: 3.5 THOU/MM3 (ref 1.8–7.7)
SODIUM SERPL-SCNC: 143 MEQ/L (ref 135–145)
TRIGLYCERIDE, FASTING: 66 MG/DL (ref 0–199)
WBC # BLD AUTO: 6.3 THOU/MM3 (ref 4.8–10.8)

## 2023-11-22 ENCOUNTER — TELEPHONE (OUTPATIENT)
Dept: FAMILY MEDICINE CLINIC | Age: 53
End: 2023-11-22

## 2023-11-22 NOTE — TELEPHONE ENCOUNTER
----- Message from RAJ Keys CNP sent at 11/22/2023  3:38 PM EST -----  Please let Karthikeyan Abad know that I reviewed her labs, and everything is in normal range.

## 2023-11-27 ENCOUNTER — TELEPHONE (OUTPATIENT)
Dept: FAMILY MEDICINE CLINIC | Age: 53
End: 2023-11-27

## 2023-11-28 ENCOUNTER — OFFICE VISIT (OUTPATIENT)
Dept: FAMILY MEDICINE CLINIC | Age: 53
End: 2023-11-28
Payer: COMMERCIAL

## 2023-11-28 VITALS
HEART RATE: 80 BPM | OXYGEN SATURATION: 98 % | RESPIRATION RATE: 16 BRPM | DIASTOLIC BLOOD PRESSURE: 86 MMHG | TEMPERATURE: 98.5 F | SYSTOLIC BLOOD PRESSURE: 136 MMHG

## 2023-11-28 DIAGNOSIS — R05.1 ACUTE COUGH: Primary | ICD-10-CM

## 2023-11-28 DIAGNOSIS — R09.89 CHEST CONGESTION: ICD-10-CM

## 2023-11-28 PROCEDURE — 3075F SYST BP GE 130 - 139MM HG: CPT | Performed by: NURSE PRACTITIONER

## 2023-11-28 PROCEDURE — G8419 CALC BMI OUT NRM PARAM NOF/U: HCPCS | Performed by: NURSE PRACTITIONER

## 2023-11-28 PROCEDURE — 99214 OFFICE O/P EST MOD 30 MIN: CPT | Performed by: NURSE PRACTITIONER

## 2023-11-28 PROCEDURE — 3079F DIAST BP 80-89 MM HG: CPT | Performed by: NURSE PRACTITIONER

## 2023-11-28 PROCEDURE — 1036F TOBACCO NON-USER: CPT | Performed by: NURSE PRACTITIONER

## 2023-11-28 PROCEDURE — G8427 DOCREV CUR MEDS BY ELIG CLIN: HCPCS | Performed by: NURSE PRACTITIONER

## 2023-11-28 PROCEDURE — 3017F COLORECTAL CA SCREEN DOC REV: CPT | Performed by: NURSE PRACTITIONER

## 2023-11-28 PROCEDURE — G8484 FLU IMMUNIZE NO ADMIN: HCPCS | Performed by: NURSE PRACTITIONER

## 2023-11-28 RX ORDER — AZITHROMYCIN 250 MG/1
250 TABLET, FILM COATED ORAL SEE ADMIN INSTRUCTIONS
Qty: 6 TABLET | Refills: 0 | Status: SHIPPED | OUTPATIENT
Start: 2023-11-28 | End: 2023-12-03

## 2023-11-28 RX ORDER — PREDNISONE 20 MG/1
20 TABLET ORAL DAILY
Qty: 5 TABLET | Refills: 0 | Status: SHIPPED | OUTPATIENT
Start: 2023-11-28 | End: 2023-12-03

## 2023-11-28 RX ORDER — CEFDINIR 300 MG/1
600 CAPSULE ORAL DAILY
Qty: 14 CAPSULE | Refills: 0 | Status: SHIPPED | OUTPATIENT
Start: 2023-11-28 | End: 2023-12-05

## 2023-11-28 RX ORDER — BENZONATATE 200 MG/1
200 CAPSULE ORAL 3 TIMES DAILY PRN
Qty: 21 CAPSULE | Refills: 0 | Status: SHIPPED | OUTPATIENT
Start: 2023-11-28 | End: 2023-12-05

## 2023-11-28 RX ORDER — ALBUTEROL SULFATE 90 UG/1
2 AEROSOL, METERED RESPIRATORY (INHALATION) 4 TIMES DAILY PRN
Qty: 18 G | Refills: 0 | Status: SHIPPED | OUTPATIENT
Start: 2023-11-28

## 2023-11-28 ASSESSMENT — ENCOUNTER SYMPTOMS
CHOKING: 0
COLOR CHANGE: 0
EYE DISCHARGE: 0
COUGH: 1
VOICE CHANGE: 0
NAUSEA: 0
EYE ITCHING: 0
WHEEZING: 1
BACK PAIN: 0
SINUS PRESSURE: 0
VOMITING: 0
SHORTNESS OF BREATH: 0
ABDOMINAL PAIN: 0
ABDOMINAL DISTENTION: 0
CONSTIPATION: 0
CHEST TIGHTNESS: 0
EYE PAIN: 0

## 2023-11-28 NOTE — TELEPHONE ENCOUNTER
Patient calling in stating that she had her wellness on 11/2/23 and was also given an antibiotic for sinus infection. She states that she picked up medication and completed. Patient is asking if you could send in a new Rx for her as she is still experiencing a wet loose cough and sinus drainage. Denies SOB, Chest pain, or fever. .   Patient voiced that she has tried Claritin with no relief. . did advise patient that she will likely need seen again since her last visit was 11/2/23- patient states that she can not come in for an appointment as she works the same hours that we are open.     Please advise
Spoke with patient- she is off today and agreed to an appointment- appointment scheduled
[FreeTextEntry1] : Medicare annual\par had flu shot\par health Maintenance reviewed and up to date\par \par \par INR 3.8 skip warfarin today\par then reduce to 10 mg weekly take mon thru friday\par none on weekend\par blood pressure and afib ok\par restart diuretic with edema

## 2023-11-28 NOTE — PROGRESS NOTES
[address] [facility]  [address] [facility] [address]  Dept: [phone]  Dept Fax: [phone]  Loc: Stefanie Vila is a 46 y.o. female who presents todayfor Cough (Covid in Oct- prescribed antibiotic early Nov- /) and Ear Fullness (X yesterday )      HPI:      Patient presents with:  Cough: Covid in Oct- prescribed antibiotic early Nov-     Ear Fullness: X yesterday     Coughing so much at the end of the day she is sleep 4-5 hours and then up and down all night. Left ear hearing is mukesh. Hot/cold t/o night   Last few days things are worse. Cough  Associated symptoms include ear pain and wheezing (when laying flat). Pertinent negatives include no chest pain, chills, fever, headaches, rash or shortness of breath. Ear Fullness   Associated symptoms include coughing and hearing loss. Pertinent negatives include no abdominal pain, ear discharge, headaches, neck pain, rash or vomiting. The patient is allergic to wasp venom, hornet venom, and shellfish-derived products. Past ePtar Issa  has a past medical history of Acid reflux, High cholesterol, Hypercholesteremia, Hypertension, and Restless legs syndrome.     Medications    Current Outpatient Medications:     cefdinir (OMNICEF) 300 MG capsule, Take 2 capsules by mouth daily for 7 days, Disp: 14 capsule, Rfl: 0    azithromycin (ZITHROMAX) 250 MG tablet, Take 1 tablet by mouth See Admin Instructions for 5 days 500mg on day 1 followed by 250mg on days 2 - 5, Disp: 6 tablet, Rfl: 0    albuterol sulfate HFA (VENTOLIN HFA) 108 (90 Base) MCG/ACT inhaler, Inhale 2 puffs into the lungs 4 times daily as needed for Wheezing, Disp: 18 g, Rfl: 0    predniSONE (DELTASONE) 20 MG tablet, Take 1 tablet by mouth daily for 5 days, Disp: 5 tablet, Rfl: 0    benzonatate (TESSALON) 200 MG capsule, Take 1 capsule by mouth 3 times daily as needed for Cough, Disp: 21 capsule, Rfl:

## 2023-11-30 DIAGNOSIS — R09.81 SINUS CONGESTION: ICD-10-CM

## 2023-11-30 RX ORDER — FLUTICASONE PROPIONATE 50 MCG
SPRAY, SUSPENSION (ML) NASAL
Qty: 48 G | Refills: 3 | OUTPATIENT
Start: 2023-11-30 | End: 2024-11-29

## 2023-12-06 ENCOUNTER — HOSPITAL ENCOUNTER (OUTPATIENT)
Age: 53
Discharge: HOME | End: 2023-12-06
Payer: COMMERCIAL

## 2023-12-06 ENCOUNTER — HOSPITAL ENCOUNTER (OUTPATIENT)
Dept: GENERAL RADIOLOGY | Age: 53
Discharge: HOME | End: 2023-12-06
Payer: COMMERCIAL

## 2023-12-06 DIAGNOSIS — R06.2 WHEEZING: ICD-10-CM

## 2023-12-06 DIAGNOSIS — R05.1 ACUTE COUGH: ICD-10-CM

## 2023-12-06 PROCEDURE — 71046 X-RAY EXAM CHEST 2 VIEWS: CPT

## 2023-12-11 LAB — NONINV COLON CA DNA+OCC BLD SCRN STL QL: NEGATIVE

## 2023-12-15 ENCOUNTER — TELEPHONE (OUTPATIENT)
Dept: FAMILY MEDICINE CLINIC | Age: 53
End: 2023-12-15

## 2023-12-15 NOTE — TELEPHONE ENCOUNTER
----- Message from RAJ Fung CNP sent at 12/14/2023  5:02 PM EST -----  Great news cologaurd was negative. Recommend repeat screening in three years.

## 2024-01-14 DIAGNOSIS — R09.89 CHEST CONGESTION: ICD-10-CM

## 2024-01-14 DIAGNOSIS — R05.1 ACUTE COUGH: ICD-10-CM

## 2024-01-15 RX ORDER — ALBUTEROL SULFATE 90 UG/1
AEROSOL, METERED RESPIRATORY (INHALATION)
Qty: 8.5 G | Refills: 0 | OUTPATIENT
Start: 2024-01-15 | End: 2024-02-14

## 2024-07-17 DIAGNOSIS — I10 ESSENTIAL HYPERTENSION: ICD-10-CM

## 2024-07-17 RX ORDER — CARVEDILOL 3.12 MG/1
3.12 TABLET ORAL 2 TIMES DAILY WITH MEALS
Qty: 180 TABLET | Refills: 3 | OUTPATIENT
Start: 2024-07-17

## 2024-07-17 NOTE — TELEPHONE ENCOUNTER
Spoke with patient about refill request for Carvedilol, since it was an early request. Tried to get patient scheduled for her annual wellness. She then stated that her Cardiologist at [facility] was taking over the Carvedilol and Atorvastatin.   She plans to call back to schedule her annual.

## 2024-10-13 DIAGNOSIS — E78.00 HIGH CHOLESTEROL: ICD-10-CM

## 2024-10-14 RX ORDER — ATORVASTATIN CALCIUM 20 MG/1
20 TABLET, FILM COATED ORAL DAILY
Qty: 30 TABLET | Refills: 0 | Status: SHIPPED | OUTPATIENT
Start: 2024-10-14 | End: 2025-10-14

## 2024-10-14 NOTE — TELEPHONE ENCOUNTER
Patient's last appointment was : 11/28/2023  Patient's next appointment is : Visit date not found  Last sent in: 11/2/23 90 tabs with 3 refills

## 2024-12-23 ENCOUNTER — TELEPHONE (OUTPATIENT)
Dept: FAMILY MEDICINE CLINIC | Age: 54
End: 2024-12-23

## 2024-12-23 NOTE — TELEPHONE ENCOUNTER
Patient has an appt. With her gynecologist in April 2025 and plans to have that provider order the mammogram.     She will call back to schedule her wellness physical at a later time, as her  just had surgery and she has her \"hands full\" with that.

## 2025-02-12 DIAGNOSIS — E78.00 HIGH CHOLESTEROL: ICD-10-CM

## 2025-02-12 RX ORDER — ATORVASTATIN CALCIUM 20 MG/1
20 TABLET, FILM COATED ORAL DAILY
Qty: 30 TABLET | Refills: 0 | OUTPATIENT
Start: 2025-02-12 | End: 2026-02-12

## 2025-02-12 NOTE — TELEPHONE ENCOUNTER
Patient's last appointment was : 11/28/2023  Patient's next appointment is : Visit date not found  Last sent in: 10/14/24 30 tabs with 0 refills

## 2025-04-09 ENCOUNTER — TRANSCRIBE ORDERS (OUTPATIENT)
Dept: ADMINISTRATIVE | Age: 55
End: 2025-04-09

## 2025-04-09 DIAGNOSIS — Z12.31 ENCOUNTER FOR SCREENING MAMMOGRAM FOR MALIGNANT NEOPLASM OF BREAST: Primary | ICD-10-CM

## 2025-08-14 ENCOUNTER — PATIENT MESSAGE (OUTPATIENT)
Dept: FAMILY MEDICINE CLINIC | Age: 55
End: 2025-08-14

## 2025-08-14 ENCOUNTER — OFFICE VISIT (OUTPATIENT)
Dept: FAMILY MEDICINE CLINIC | Age: 55
End: 2025-08-14
Payer: COMMERCIAL

## 2025-08-14 VITALS
WEIGHT: 148.8 LBS | DIASTOLIC BLOOD PRESSURE: 86 MMHG | SYSTOLIC BLOOD PRESSURE: 118 MMHG | BODY MASS INDEX: 21.97 KG/M2 | OXYGEN SATURATION: 100 % | RESPIRATION RATE: 18 BRPM | HEART RATE: 63 BPM

## 2025-08-14 DIAGNOSIS — R20.2 NUMBNESS AND TINGLING: Primary | ICD-10-CM

## 2025-08-14 DIAGNOSIS — M25.50 PAIN IN JOINT INVOLVING MULTIPLE SITES: ICD-10-CM

## 2025-08-14 DIAGNOSIS — R20.0 NUMBNESS AND TINGLING: Primary | ICD-10-CM

## 2025-08-14 PROBLEM — U07.1 COVID: Status: ACTIVE | Noted: 2022-06-01

## 2025-08-14 PROBLEM — I34.1 NONRHEUMATIC MITRAL VALVE PROLAPSE: Status: ACTIVE | Noted: 2024-04-11

## 2025-08-14 PROBLEM — Q24.5 CORONARY-MYOCARDIAL BRIDGE: Status: ACTIVE | Noted: 2024-03-12

## 2025-08-14 PROBLEM — I71.20 THORACIC AORTIC ANEURYSM (TAA): Status: ACTIVE | Noted: 2024-03-08

## 2025-08-14 PROBLEM — E05.90 HYPERTHYROIDISM: Status: ACTIVE | Noted: 2021-03-24

## 2025-08-14 LAB
25(OH)D3 SERPL-MCNC: 71 NG/ML (ref 30–100)
ALBUMIN SERPL BCG-MCNC: 4.6 G/DL (ref 3.4–4.9)
ALP SERPL-CCNC: 110 U/L (ref 38–126)
ALT SERPL W/O P-5'-P-CCNC: 28 U/L (ref 10–35)
ANION GAP SERPL CALC-SCNC: 11 MEQ/L (ref 8–16)
AST SERPL-CCNC: 27 U/L (ref 10–35)
BASOPHILS ABSOLUTE: 0.1 THOU/MM3 (ref 0–0.1)
BASOPHILS NFR BLD AUTO: 1.1 %
BILIRUB SERPL-MCNC: 0.3 MG/DL (ref 0.3–1.2)
BUN SERPL-MCNC: 13 MG/DL (ref 8–23)
CALCIUM SERPL-MCNC: 9.6 MG/DL (ref 8.5–10.5)
CHLORIDE SERPL-SCNC: 106 MEQ/L (ref 98–111)
CO2 SERPL-SCNC: 26 MEQ/L (ref 22–29)
CREAT SERPL-MCNC: 0.7 MG/DL (ref 0.5–0.9)
CRP SERPL-MCNC: < 0.3 MG/DL (ref 0–0.5)
DEPRECATED MEAN GLUCOSE BLD GHB EST-ACNC: 111 MG/DL (ref 70–126)
DEPRECATED RDW RBC AUTO: 40.6 FL (ref 35–45)
EOSINOPHIL NFR BLD AUTO: 2.4 %
EOSINOPHILS ABSOLUTE: 0.2 THOU/MM3 (ref 0–0.4)
ERYTHROCYTE [DISTWIDTH] IN BLOOD BY AUTOMATED COUNT: 11.9 % (ref 11.5–14.5)
ERYTHROCYTE [SEDIMENTATION RATE] IN BLOOD BY WESTERGREN METHOD: 3 MM/HR (ref 0–20)
FERRITIN SERPL IA-MCNC: 191 NG/ML (ref 13–150)
FOLATE SERPL-MCNC: > 20 NG/ML (ref 4.6–34.8)
GFR SERPL CREATININE-BSD FRML MDRD: > 90 ML/MIN/1.73M2
GLUCOSE SERPL-MCNC: 89 MG/DL (ref 74–109)
HBA1C MFR BLD HPLC: 5.7 % (ref 4–6)
HCT VFR BLD AUTO: 39.6 % (ref 37–47)
HGB BLD-MCNC: 13 GM/DL (ref 12–16)
IMM GRANULOCYTES # BLD AUTO: 0.01 THOU/MM3 (ref 0–0.07)
IMM GRANULOCYTES NFR BLD AUTO: 0.1 %
IRON SATN MFR SERPL: 38 % (ref 20–50)
IRON SERPL-MCNC: 114 UG/DL (ref 37–145)
LYMPHOCYTES ABSOLUTE: 2 THOU/MM3 (ref 1–4.8)
LYMPHOCYTES NFR BLD AUTO: 27.9 %
MCH RBC QN AUTO: 30.5 PG (ref 26–33)
MCHC RBC AUTO-ENTMCNC: 32.8 GM/DL (ref 32.2–35.5)
MCV RBC AUTO: 93 FL (ref 81–99)
MONOCYTES ABSOLUTE: 0.7 THOU/MM3 (ref 0.4–1.3)
MONOCYTES NFR BLD AUTO: 9.3 %
NEUTROPHILS ABSOLUTE: 4.2 THOU/MM3 (ref 1.8–7.7)
NEUTROPHILS NFR BLD AUTO: 59.2 %
NRBC BLD AUTO-RTO: 0 /100 WBC
PLATELET # BLD AUTO: 347 THOU/MM3 (ref 130–400)
PMV BLD AUTO: 10 FL (ref 9.4–12.4)
POTASSIUM SERPL-SCNC: 4.1 MEQ/L (ref 3.5–5.2)
PROT SERPL-MCNC: 6.8 G/DL (ref 6.4–8.3)
RBC # BLD AUTO: 4.26 MILL/MM3 (ref 4.2–5.4)
RHEUMATOID FACT SERPL-ACNC: < 10 IU/ML (ref 0–13)
SODIUM SERPL-SCNC: 143 MEQ/L (ref 135–145)
TIBC SERPL-MCNC: 297 UG/DL (ref 171–450)
TSH SERPL DL<=0.05 MIU/L-ACNC: 2.24 UIU/ML (ref 0.27–4.2)
VIT B12 SERPL-MCNC: 691 PG/ML (ref 232–1245)
WBC # BLD AUTO: 7.1 THOU/MM3 (ref 4.8–10.8)

## 2025-08-14 PROCEDURE — 1036F TOBACCO NON-USER: CPT | Performed by: STUDENT IN AN ORGANIZED HEALTH CARE EDUCATION/TRAINING PROGRAM

## 2025-08-14 PROCEDURE — 3074F SYST BP LT 130 MM HG: CPT | Performed by: STUDENT IN AN ORGANIZED HEALTH CARE EDUCATION/TRAINING PROGRAM

## 2025-08-14 PROCEDURE — G8427 DOCREV CUR MEDS BY ELIG CLIN: HCPCS | Performed by: STUDENT IN AN ORGANIZED HEALTH CARE EDUCATION/TRAINING PROGRAM

## 2025-08-14 PROCEDURE — 36415 COLL VENOUS BLD VENIPUNCTURE: CPT | Performed by: STUDENT IN AN ORGANIZED HEALTH CARE EDUCATION/TRAINING PROGRAM

## 2025-08-14 PROCEDURE — G8420 CALC BMI NORM PARAMETERS: HCPCS | Performed by: STUDENT IN AN ORGANIZED HEALTH CARE EDUCATION/TRAINING PROGRAM

## 2025-08-14 PROCEDURE — 99214 OFFICE O/P EST MOD 30 MIN: CPT | Performed by: STUDENT IN AN ORGANIZED HEALTH CARE EDUCATION/TRAINING PROGRAM

## 2025-08-14 PROCEDURE — 3017F COLORECTAL CA SCREEN DOC REV: CPT | Performed by: STUDENT IN AN ORGANIZED HEALTH CARE EDUCATION/TRAINING PROGRAM

## 2025-08-14 PROCEDURE — 3079F DIAST BP 80-89 MM HG: CPT | Performed by: STUDENT IN AN ORGANIZED HEALTH CARE EDUCATION/TRAINING PROGRAM

## 2025-08-14 SDOH — ECONOMIC STABILITY: FOOD INSECURITY: WITHIN THE PAST 12 MONTHS, THE FOOD YOU BOUGHT JUST DIDN'T LAST AND YOU DIDN'T HAVE MONEY TO GET MORE.: NEVER TRUE

## 2025-08-14 SDOH — ECONOMIC STABILITY: FOOD INSECURITY: WITHIN THE PAST 12 MONTHS, YOU WORRIED THAT YOUR FOOD WOULD RUN OUT BEFORE YOU GOT MONEY TO BUY MORE.: NEVER TRUE

## 2025-08-14 ASSESSMENT — PATIENT HEALTH QUESTIONNAIRE - PHQ9
SUM OF ALL RESPONSES TO PHQ QUESTIONS 1-9: 0
2. FEELING DOWN, DEPRESSED OR HOPELESS: NOT AT ALL
SUM OF ALL RESPONSES TO PHQ QUESTIONS 1-9: 0
1. LITTLE INTEREST OR PLEASURE IN DOING THINGS: NOT AT ALL
SUM OF ALL RESPONSES TO PHQ QUESTIONS 1-9: 0
SUM OF ALL RESPONSES TO PHQ QUESTIONS 1-9: 0

## 2025-08-16 LAB — NUCLEAR IGG SER QL IA: DETECTED

## 2025-08-17 LAB
ANA PAT SER IF-IMP: NORMAL
NUCLEAR IGG SER QL IF: NORMAL

## 2025-08-18 DIAGNOSIS — R20.0 NUMBNESS AND TINGLING: Primary | ICD-10-CM

## 2025-08-18 DIAGNOSIS — M25.50 PAIN IN JOINT INVOLVING MULTIPLE SITES: ICD-10-CM

## 2025-08-18 DIAGNOSIS — R20.2 NUMBNESS AND TINGLING: Primary | ICD-10-CM

## 2025-08-18 RX ORDER — PREDNISONE 10 MG/1
TABLET ORAL
Qty: 30 TABLET | Refills: 0 | Status: SHIPPED | OUTPATIENT
Start: 2025-08-18 | End: 2025-08-28

## 2025-08-25 ASSESSMENT — RHEUMATOLOGY NEW PATIENT QUESTIONNAIRE
BEHAVIORAL CHANGES: N
JOINT PAIN: Y
MUSCLE WEAKNESS: Y
PERSISTENT DIARRHEA: N
EASILY LOSING TEMPER: N
RASH: N
EXCESSIVE HAIR LOSS (MORE THAN YOUR NORM): N
SKIN TIGHTNESS: N
EYE DRYNESS: N
SUN SENSITIVE (SUN ALLERGY): N
DIFFICULTY SWALLOWING: N
SWOLLEN LEGS OR FEET: N
UNUSUAL FATIGUE: N
DIFFICULTY FALLING ASLEEP: N
VAGINAL DRYNESS: N
LOSS OF VISION: N
LOSS OF CONSCIOUSNESS: N
ANEMIA: N
NAUSEA: N
NODULES/BUMPS: N
DIFFICULTY STAYING ASLEEP: Y
DRYNESS OF MOUTH: N
HEARTBURN OR REFLUX: N
UNUSUAL BLEEDING: N
UNEXPLAINED WEIGHT CHANGE: N
AGITATION: N
HEADACHES: N
UNUSUALLY RAPID OR SLOWED HEART RATE: N
MORNING STIFFNESS: Y
COLOR CHANGES OF HANDS OR FEET IN THE COLD: N
ANXIETY: N
FAINTING: N
EYE REDNESS: N
VOMITING OF BLOOD OR COFFEE GROUND CONSISTENCY MATERIAL: N
EYE PAIN: N
UNEXPLAINED HEARING LOSS: N
SWOLLEN OR TENDER GLANDS: N
HOARSE VOICE: N
BLACK STOOLS: N
INCREASED SUSCEPTIBILITY TO INFECTION: N
LIST JOINTS AFFECTED BY SWELLING IN THE PAST MONTH: FINGERS
COUGH: N
MEMORY LOSS: N
NUMBNESS OR TINGLING IN HANDS OR FEET: Y
CHEST PAIN: N
EASY BRUISING: N
JAUNDICE: N
DEPRESSION: N
STOMACH PAIN: N
SKIN REDNESS: N
DOUBLE OR BLURRED VISION: N
SORES IN MOUTH OR NOSE: N
PAIN OR BURNING ON URINATION: N
DIFFICULTY BREATHING LYING DOWN: N
BLOOD IN STOOLS: N
RASH OR ULCERS: N
SHORTNESS OF BREATH: N
NIGHT SWEATS: N
SEIZURES: N
JOINT SWELLING: Y
MORNING STIFFNESS IN LOWER BACK: N
ABNORMAL URINE: N
FEVER: N
HOW WOULD YOU DESCRIBE YOUR STIFFNESS ON AVERAGE: MODERATE

## 2025-08-28 ENCOUNTER — OFFICE VISIT (OUTPATIENT)
Age: 55
End: 2025-08-28
Payer: COMMERCIAL

## 2025-08-28 VITALS
SYSTOLIC BLOOD PRESSURE: 118 MMHG | WEIGHT: 148.81 LBS | OXYGEN SATURATION: 98 % | HEIGHT: 69 IN | BODY MASS INDEX: 22.04 KG/M2 | HEART RATE: 70 BPM | DIASTOLIC BLOOD PRESSURE: 74 MMHG

## 2025-08-28 DIAGNOSIS — G89.29 CHRONIC NECK PAIN: Primary | ICD-10-CM

## 2025-08-28 DIAGNOSIS — R20.0 NUMBNESS AND TINGLING OF HAND: ICD-10-CM

## 2025-08-28 DIAGNOSIS — M54.2 CHRONIC NECK PAIN: Primary | ICD-10-CM

## 2025-08-28 DIAGNOSIS — R20.2 NUMBNESS AND TINGLING OF HAND: ICD-10-CM

## 2025-08-28 DIAGNOSIS — M62.541 MUSCLE WASTING AND ATROPHY, NOT ELSEWHERE CLASSIFIED, RIGHT HAND: ICD-10-CM

## 2025-08-28 DIAGNOSIS — M79.642 PAIN IN BOTH HANDS: ICD-10-CM

## 2025-08-28 DIAGNOSIS — M79.641 PAIN IN BOTH HANDS: ICD-10-CM

## 2025-08-28 PROCEDURE — 1036F TOBACCO NON-USER: CPT | Performed by: INTERNAL MEDICINE

## 2025-08-28 PROCEDURE — 3074F SYST BP LT 130 MM HG: CPT | Performed by: INTERNAL MEDICINE

## 2025-08-28 PROCEDURE — 3078F DIAST BP <80 MM HG: CPT | Performed by: INTERNAL MEDICINE

## 2025-08-28 PROCEDURE — G8420 CALC BMI NORM PARAMETERS: HCPCS | Performed by: INTERNAL MEDICINE

## 2025-08-28 PROCEDURE — G8427 DOCREV CUR MEDS BY ELIG CLIN: HCPCS | Performed by: INTERNAL MEDICINE

## 2025-08-28 PROCEDURE — 99204 OFFICE O/P NEW MOD 45 MIN: CPT | Performed by: INTERNAL MEDICINE

## 2025-08-28 PROCEDURE — 3017F COLORECTAL CA SCREEN DOC REV: CPT | Performed by: INTERNAL MEDICINE

## 2025-08-28 ASSESSMENT — ENCOUNTER SYMPTOMS
VOMITING: 0
COUGH: 0
SHORTNESS OF BREATH: 0
ABDOMINAL PAIN: 0
NAUSEA: 0

## 2025-08-28 ASSESSMENT — LIFESTYLE VARIABLES
HOW MANY STANDARD DRINKS CONTAINING ALCOHOL DO YOU HAVE ON A TYPICAL DAY: 1 OR 2
HOW OFTEN DO YOU HAVE A DRINK CONTAINING ALCOHOL: MONTHLY OR LESS

## 2025-08-29 ENCOUNTER — HOSPITAL ENCOUNTER (OUTPATIENT)
Dept: GENERAL RADIOLOGY | Age: 55
Discharge: HOME | End: 2025-08-29
Payer: COMMERCIAL

## 2025-08-29 ENCOUNTER — APPOINTMENT (OUTPATIENT)
Dept: GENERAL RADIOLOGY | Age: 55
End: 2025-08-29
Payer: COMMERCIAL

## 2025-08-29 DIAGNOSIS — M79.641 PAIN IN BOTH HANDS: ICD-10-CM

## 2025-08-29 DIAGNOSIS — M79.642 PAIN IN BOTH HANDS: ICD-10-CM

## 2025-08-29 PROCEDURE — 73130 X-RAY EXAM OF HAND: CPT

## 2025-08-29 PROCEDURE — 36415 COLL VENOUS BLD VENIPUNCTURE: CPT

## 2025-08-29 PROCEDURE — 86200 CCP ANTIBODY: CPT

## 2025-08-29 ASSESSMENT — ENCOUNTER SYMPTOMS
COUGH: 0
NAUSEA: 0
CONSTIPATION: 0
SHORTNESS OF BREATH: 0
DIARRHEA: 0
BACK PAIN: 1
VOMITING: 0

## 2025-09-01 ENCOUNTER — HOSPITAL ENCOUNTER (OUTPATIENT)
Dept: GENERAL RADIOLOGY | Age: 55
Discharge: HOME | End: 2025-09-01
Payer: COMMERCIAL

## 2025-09-01 LAB — CYCLIC CITRULLINATED PEPTIDE ANTIBODY IGG: 0.8 U/ML (ref 0–7)

## 2025-09-01 PROCEDURE — 73130 X-RAY EXAM OF HAND: CPT
